# Patient Record
Sex: MALE | Race: BLACK OR AFRICAN AMERICAN | NOT HISPANIC OR LATINO | URBAN - METROPOLITAN AREA
[De-identification: names, ages, dates, MRNs, and addresses within clinical notes are randomized per-mention and may not be internally consistent; named-entity substitution may affect disease eponyms.]

---

## 2017-05-21 ENCOUNTER — EMERGENCY (EMERGENCY)
Facility: HOSPITAL | Age: 42
LOS: 1 days | Discharge: HOME | End: 2017-05-21
Attending: EMERGENCY MEDICINE | Admitting: EMERGENCY MEDICINE
Payer: SELF-PAY

## 2017-05-21 VITALS
SYSTOLIC BLOOD PRESSURE: 154 MMHG | OXYGEN SATURATION: 97 % | TEMPERATURE: 98 F | HEART RATE: 68 BPM | DIASTOLIC BLOOD PRESSURE: 97 MMHG | RESPIRATION RATE: 18 BRPM

## 2017-05-21 DIAGNOSIS — M25.571 PAIN IN RIGHT ANKLE AND JOINTS OF RIGHT FOOT: ICD-10-CM

## 2017-05-21 DIAGNOSIS — L60.0 INGROWING NAIL: ICD-10-CM

## 2017-05-21 PROCEDURE — 99284 EMERGENCY DEPT VISIT MOD MDM: CPT

## 2017-05-21 RX ORDER — DIAZEPAM 5 MG
1 TABLET ORAL
Qty: 12 | Refills: 0 | OUTPATIENT
Start: 2017-05-21 | End: 2017-05-25

## 2017-05-21 RX ORDER — IBUPROFEN 200 MG
1 TABLET ORAL
Qty: 21 | Refills: 0 | OUTPATIENT
Start: 2017-05-21 | End: 2017-05-28

## 2017-05-21 NOTE — ED PROVIDER NOTE - PHYSICAL EXAMINATION
NAD, VSS, Afebrile, No spinal tender, + right great toe nail clean and dry, a small growth to media aspect of toenail, mild tender, no discharge, eryth or cellulitis, N/V- intact.

## 2017-05-21 NOTE — ED ADULT NURSE NOTE - OBJECTIVE STATEMENT
41 year old male A&OX3 presents with right first toe pain. Patient states that he had an ingrown toe nail that was causing pain. Patient was seen by urgent care and told to follow up with PMD. Patient states pain began to get worse and patient presented to podiatry. Patient then was given antibiotics and told to follow up after completion. Patient states that pain has been getting worse and that there appears to be a growth on the toe.

## 2017-05-21 NOTE — ED PROVIDER NOTE - MEDICAL DECISION MAKING DETAILS
Nemes - 42yo M w R halux pain/swelling/pus discharge. Seen [facility] and Podiatry, finished 2 wks of Keflex. Still w pain and pus DC. Will discuss w Podiatry for ER consult vs urgent f/u in the Clinic/office

## 2017-05-21 NOTE — ED PROVIDER NOTE - OBJECTIVE STATEMENT
42yo male pt, ambulatory c/o right great toe pain/ wound.  NITO Figueroa in  trimmed ingrown toe nail 5 weeks ago and he was evaluated by podiatrist 2 weeks ago (on Keflex 500mg BID for 2 weeks). Denies fever or chills. Denies sensory changes or weakness to extremities.

## 2017-05-25 PROBLEM — Z00.00 ENCOUNTER FOR PREVENTIVE HEALTH EXAMINATION: Status: ACTIVE | Noted: 2017-05-25

## 2017-06-16 ENCOUNTER — APPOINTMENT (OUTPATIENT)
Dept: PODIATRY | Facility: HOSPITAL | Age: 42
End: 2017-06-16

## 2017-07-11 NOTE — ED ADULT NURSE NOTE - PAIN: PRESENCE, MLM
FINAL PATHOLOGIC DIAGNOSIS  Stomach, antrum, biopsy:  Moderate chronic active gastritis.  Positive for Helicobacter on routine stain.    H. pylori infection  -     pantoprazole (PROTONIX) 40 MG tablet; Take 1 tablet (40 mg total) by mouth once daily.  Dispense: 30 tablet; Refill: 11  -     amoxicillin (AMOXIL) 500 MG Tab; Take 1 tablet (500 mg total) by mouth every 12 (twelve) hours.  Dispense: 20 tablet; Refill: 0  -     clarithromycin (BIAXIN) 500 MG tablet; Take 1 tablet (500 mg total) by mouth every 12 (twelve) hours.  Dispense: 20 tablet; Refill: 0        Please let the patient know.      Dominguez Nolen    
complains of pain/discomfort

## 2018-03-26 ENCOUNTER — APPOINTMENT (OUTPATIENT)
Dept: CARDIOLOGY | Facility: CLINIC | Age: 43
End: 2018-03-26

## 2019-03-09 ENCOUNTER — TRANSCRIPTION ENCOUNTER (OUTPATIENT)
Age: 44
End: 2019-03-09

## 2019-04-06 ENCOUNTER — TRANSCRIPTION ENCOUNTER (OUTPATIENT)
Age: 44
End: 2019-04-06

## 2019-04-15 ENCOUNTER — APPOINTMENT (OUTPATIENT)
Dept: MRI IMAGING | Facility: CLINIC | Age: 44
End: 2019-04-15

## 2019-08-09 ENCOUNTER — EMERGENCY (EMERGENCY)
Facility: HOSPITAL | Age: 44
LOS: 1 days | Discharge: HOME | End: 2019-08-09
Attending: EMERGENCY MEDICINE | Admitting: EMERGENCY MEDICINE
Payer: COMMERCIAL

## 2019-08-09 VITALS
HEART RATE: 82 BPM | SYSTOLIC BLOOD PRESSURE: 145 MMHG | TEMPERATURE: 98 F | OXYGEN SATURATION: 100 % | DIASTOLIC BLOOD PRESSURE: 93 MMHG | RESPIRATION RATE: 17 BRPM

## 2019-08-09 PROCEDURE — 99282 EMERGENCY DEPT VISIT SF MDM: CPT

## 2019-08-09 NOTE — ED PROVIDER NOTE - NS ED ROS FT
Gen: Denies fever, weight loss  CV: Denies chest pain, palpitations  Skin: Denies rash, erythema, color changes  Resp: Denies SOB, cough  Endo: Denies sensitivity to heat, cold, increased urination  GI: + sore throat, denies constipation, nausea, vomiting  Msk: Denies back pain, LE swelling, extremity pain  : Denies dysuria, increased frequency  Neuro: Denies LOC, weakness, seizures  Psych: Denies hx of psych, hallucinations

## 2019-08-09 NOTE — ED ADULT TRIAGE NOTE - CHIEF COMPLAINT QUOTE
Patient c/o foreign body sensation in throat x1.5 weeks s/p eating at an all you can eat buffet. Patient denies any difficulty breathing or swallowing. Patient speaking in full sentences. Hx. epilepsy

## 2019-08-09 NOTE — ED PROVIDER NOTE - PHYSICAL EXAMINATION
Gen: WDWN, NAD  HEENT: EOMI, no nasal discharge, mucous membranes moist, oral hygene adequate, enlarged non-erythematous adenoids b/l, no foreign body visualized  CV: RRR, +S1/S2, no M/R/G  Resp: CTAB, no W/R/R  GI: Abdomen soft non-distended, NTTP, no masses  MSK: No open wounds, no bruising, no LE edema  Neuro: A&Ox4, following commands, moving all four extremities spontaneously  Psych: appropriate mood, denies AH, VH, SI

## 2019-08-09 NOTE — ED PROVIDER NOTE - OBJECTIVE STATEMENT
44M pmh absence seizures, enlarged adenoids,  here for throat pain. Reports two week hx of feeling of foreign body in throat since eating at buffet. Has had several episodes of rancid taste in throat after swallowing saliva and reports feeling of "something moving in throat" some mild L-sided throat pain.    Reports had x-ray taken at [facility] (but no report) and was told to come to ED for further workup. 44M pmh absence seizures, enlarged adenoids,  here for throat pain. Reports two week hx of feeling of foreign body in throat since eating at buffet. Has had several episodes of rancid taste in throat after swallowing saliva and reports feeling of "something moving in throat" some mild L-sided throat pain.    Reports had x-ray taken at [facility], unremarkable, (but no report in hand) and was told to come to ED for further workup.

## 2019-08-09 NOTE — ED PROVIDER NOTE - ATTENDING CONTRIBUTION TO CARE
Karyna: I have seen and examined the patient face to face, have reviewed and addended the HPI, PE and a/p as necessary.     43 yo M with absence seizures, enlarged adenoids a/w increasing throat pain since eating fried food at a buffet.  Pt reports having increasing pain in the throat, with L sided throat pain.  Went to urgent care with neg xray.  Pt reports having no difficulty swallowing, no stridor, able to tolerate solids and liquids with no acute issues.  No fever/chills, No photophobia/eye pain/changes in vision, No ear pain/dysphagia, No chest pain/palpitations, no SOB/cough/wheeze/stridor, No abdominal pain, No N/V/D, no dysuria/frequency/discharge, No neck/back pain, no rash, no changes in neurological status/function.     GEN - NAD; well appearing; A+O x3; non-toxic appearing   HEENT: noted to have slight abrasion on L tonsilar abscess.  no foreign body noted.    CARD -s1s2, RRR, no M,G,R;   PULM - CTA b/l, symmetric breath sounds;   ABD -  +BS, ND, NT, soft, no guarding, no rebound, no masses;   BACK - no CVA tenderness, Normal  spine;   EXT - symmetric pulses, 2+ dp, capillary refill < 2 seconds, no clubbing, no cyanosis, no edema  NEURO - no focal neuro deficits, no slurred speech    43 yo M a/w throat pain, possible tonsillar irritation, follow up with GI and ENT as described, no acute findings on exam, with no tenderness or pain at this time.

## 2019-08-09 NOTE — ED PROVIDER NOTE - NSFOLLOWUPINSTRUCTIONS_ED_ALL_ED_FT
You were seen today in our emergency department. We were unable to visualize a foreign body in throat but did note your swollen adenoids. We are referring you to gastro-intestinal medicine and to the Ear Nose and Throat physicians as discussed.    When should you seek immediate care?     You have a fever.  You have severe abdominal pain, nausea, or vomiting.   Your vomit or saliva is bloody.  Your bowel movements are black or bloody.     When should I contact my healthcare provider?     You do not find the object in your bowel movement within 2 or 3 days.  You do not want to eat because of abdominal pain or vomiting.  You are drooling or hoarse.  You have questions or concerns about your condition or care.

## 2019-08-09 NOTE — ED PROVIDER NOTE - CLINICAL SUMMARY MEDICAL DECISION MAKING FREE TEXT BOX
44M here w/ feeling of foreign body obstruction s/p buffet meal x 2 weeks no acute distress. oral exam unremakrable except swollen adenoids, no body visualized, nonstriderous.     Pt should f/u w/ ENT outpt will discuss w/ pt.

## 2019-08-12 ENCOUNTER — APPOINTMENT (OUTPATIENT)
Dept: OTOLARYNGOLOGY | Facility: CLINIC | Age: 44
End: 2019-08-12
Payer: COMMERCIAL

## 2019-08-12 VITALS
HEART RATE: 79 BPM | HEIGHT: 73 IN | BODY MASS INDEX: 31.14 KG/M2 | SYSTOLIC BLOOD PRESSURE: 131 MMHG | WEIGHT: 235 LBS | DIASTOLIC BLOOD PRESSURE: 84 MMHG

## 2019-08-12 DIAGNOSIS — J35.9 CHRONIC DISEASE OF TONSILS AND ADENOIDS, UNSPECIFIED: ICD-10-CM

## 2019-08-12 DIAGNOSIS — G52.1 DISORDERS OF GLOSSOPHARYNGEAL NERVE: ICD-10-CM

## 2019-08-12 DIAGNOSIS — J31.2 CHRONIC PHARYNGITIS: ICD-10-CM

## 2019-08-12 PROCEDURE — 31575 DIAGNOSTIC LARYNGOSCOPY: CPT

## 2019-08-12 PROCEDURE — 99203 OFFICE O/P NEW LOW 30 MIN: CPT | Mod: 25

## 2019-08-12 NOTE — REVIEW OF SYSTEMS
[Sneezing] : sneezing [Seasonal Allergies] : seasonal allergies [Problem Snoring] : problem snoring [Swollen Glands] : swollen glands [Negative] : Endocrine [Patient Intake Form Reviewed] : Patient intake form was reviewed

## 2019-08-12 NOTE — HISTORY OF PRESENT ILLNESS
[de-identified] : 2 week hx sensation left side throat\par co swollen area dx laceration\par long hx swollen neck glands, neg hx tonsillitis

## 2019-09-17 ENCOUNTER — OUTPATIENT (OUTPATIENT)
Dept: OUTPATIENT SERVICES | Facility: HOSPITAL | Age: 44
LOS: 1 days | End: 2019-09-17
Payer: COMMERCIAL

## 2019-09-17 ENCOUNTER — APPOINTMENT (OUTPATIENT)
Dept: CT IMAGING | Facility: CLINIC | Age: 44
End: 2019-09-17
Payer: COMMERCIAL

## 2019-09-17 DIAGNOSIS — Z00.8 ENCOUNTER FOR OTHER GENERAL EXAMINATION: ICD-10-CM

## 2019-09-17 PROCEDURE — 70491 CT SOFT TISSUE NECK W/DYE: CPT | Mod: 26

## 2019-09-17 PROCEDURE — 70491 CT SOFT TISSUE NECK W/DYE: CPT

## 2019-09-19 ENCOUNTER — APPOINTMENT (OUTPATIENT)
Dept: RADIOLOGY | Facility: HOSPITAL | Age: 44
End: 2019-09-19
Payer: COMMERCIAL

## 2019-09-19 ENCOUNTER — OUTPATIENT (OUTPATIENT)
Dept: OUTPATIENT SERVICES | Facility: HOSPITAL | Age: 44
LOS: 1 days | End: 2019-09-19

## 2019-09-19 DIAGNOSIS — R13.19 OTHER DYSPHAGIA: ICD-10-CM

## 2019-09-19 DIAGNOSIS — R13.13 DYSPHAGIA, PHARYNGEAL PHASE: ICD-10-CM

## 2019-09-19 PROCEDURE — 74220 X-RAY XM ESOPHAGUS 1CNTRST: CPT | Mod: 26

## 2019-10-01 ENCOUNTER — APPOINTMENT (OUTPATIENT)
Dept: ULTRASOUND IMAGING | Facility: IMAGING CENTER | Age: 44
End: 2019-10-01
Payer: COMMERCIAL

## 2019-10-01 ENCOUNTER — OUTPATIENT (OUTPATIENT)
Dept: OUTPATIENT SERVICES | Facility: HOSPITAL | Age: 44
LOS: 1 days | End: 2019-10-01
Payer: COMMERCIAL

## 2019-10-01 DIAGNOSIS — E04.1 NONTOXIC SINGLE THYROID NODULE: ICD-10-CM

## 2019-10-01 DIAGNOSIS — R59.0 LOCALIZED ENLARGED LYMPH NODES: ICD-10-CM

## 2019-10-01 PROCEDURE — 76536 US EXAM OF HEAD AND NECK: CPT

## 2019-10-01 PROCEDURE — 76536 US EXAM OF HEAD AND NECK: CPT | Mod: 26

## 2019-10-03 ENCOUNTER — APPOINTMENT (OUTPATIENT)
Dept: FAMILY MEDICINE | Facility: CLINIC | Age: 44
End: 2019-10-03
Payer: COMMERCIAL

## 2019-10-03 VITALS
HEART RATE: 87 BPM | OXYGEN SATURATION: 97 % | RESPIRATION RATE: 16 BRPM | BODY MASS INDEX: 31.14 KG/M2 | DIASTOLIC BLOOD PRESSURE: 80 MMHG | SYSTOLIC BLOOD PRESSURE: 120 MMHG | WEIGHT: 235 LBS | HEIGHT: 73 IN

## 2019-10-03 DIAGNOSIS — G40.A09 ABSENCE EPILEPTIC SYNDROME, NOT INTRACTABLE, W/OUT STATUS EPILEPTICUS: ICD-10-CM

## 2019-10-03 DIAGNOSIS — Z83.3 FAMILY HISTORY OF DIABETES MELLITUS: ICD-10-CM

## 2019-10-03 DIAGNOSIS — R05 COUGH: ICD-10-CM

## 2019-10-03 DIAGNOSIS — Z82.0 FAMILY HISTORY OF EPILEPSY AND OTHER DISEASES OF THE NERVOUS SYSTEM: ICD-10-CM

## 2019-10-03 DIAGNOSIS — Z82.49 FAMILY HISTORY OF ISCHEMIC HEART DISEASE AND OTHER DISEASES OF THE CIRCULATORY SYSTEM: ICD-10-CM

## 2019-10-03 DIAGNOSIS — R42 DIZZINESS AND GIDDINESS: ICD-10-CM

## 2019-10-03 DIAGNOSIS — Z91.018 ALLERGY TO OTHER FOODS: ICD-10-CM

## 2019-10-03 DIAGNOSIS — J39.2 OTHER DISEASES OF PHARYNX: ICD-10-CM

## 2019-10-03 DIAGNOSIS — Z80.3 FAMILY HISTORY OF MALIGNANT NEOPLASM OF BREAST: ICD-10-CM

## 2019-10-03 PROCEDURE — 99204 OFFICE O/P NEW MOD 45 MIN: CPT

## 2019-10-03 PROCEDURE — G0444 DEPRESSION SCREEN ANNUAL: CPT | Mod: 59

## 2019-10-03 NOTE — PLAN
[FreeTextEntry1] : Patient had not had bloodwork.  Advised to go for fasting bloodwork, evaluate bloodcount. Thyroid levels. \par Benign lymph node on US, some heterogenous on report.  Advised to continue and complete Augmentin.  \par \par Neuro exam intact in office, advised Neurology evaluation.\par Also evaluation for allergies-possible irritant contributing to symptoms. \par \par Ask to have recent records from ENT sent to our office.

## 2019-10-03 NOTE — REVIEW OF SYSTEMS
[Sore Throat] : sore throat [Negative] : Genitourinary [Wheezing] : no wheezing [Cough] : no cough [FreeTextEntry4] : congestion, sneezing [de-identified] : episodes of imbalance

## 2019-10-03 NOTE — HISTORY OF PRESENT ILLNESS
[FreeTextEntry8] : Shrimp irritation, started having throat irritation in August.  Was evaluated in ER.  Had CT.\par Has seen 2 ENT.  Feels like golf ball in throat-has been that way since seeing ENT, started on Augmentin last week.   \par \par Reports throat irritation after eating shrimp again last week. \par Last seizure in 2010. \par Also reports episodes of imbalance and lightheadedness.  Has not seen Neurology in over 1 year.\par \par Zyrtec when needed for allergies. \par

## 2019-10-03 NOTE — PHYSICAL EXAM
[Normal Sclera/Conjunctiva] : normal sclera/conjunctiva [PERRL] : pupils equal round and reactive to light [Normal TMs] : both tympanic membranes were normal [No Extremity Clubbing/Cyanosis] : no extremity clubbing/cyanosis [Normal] : soft, non-tender, non-distended, no masses palpated, no HSM and normal bowel sounds [Alert and Oriented x3] : oriented to person, place, and time [de-identified] : erythema, no exudate [de-identified] : CN II-XII intact

## 2019-10-21 ENCOUNTER — LABORATORY RESULT (OUTPATIENT)
Age: 44
End: 2019-10-21

## 2019-10-22 ENCOUNTER — OTHER (OUTPATIENT)
Age: 44
End: 2019-10-22

## 2019-10-22 LAB
ALBUMIN SERPL ELPH-MCNC: 4.1 G/DL
ALP BLD-CCNC: 121 U/L
ALT SERPL-CCNC: 22 U/L
ANION GAP SERPL CALC-SCNC: 13 MMOL/L
AST SERPL-CCNC: 16 U/L
BASOPHILS # BLD AUTO: 0.02 K/UL
BASOPHILS NFR BLD AUTO: 0.4 %
BILIRUB SERPL-MCNC: 0.8 MG/DL
BUN SERPL-MCNC: 12 MG/DL
CALCIUM SERPL-MCNC: 9.7 MG/DL
CHLORIDE SERPL-SCNC: 103 MMOL/L
CHOLEST SERPL-MCNC: 111 MG/DL
CHOLEST/HDLC SERPL: 4 RATIO
CO2 SERPL-SCNC: 24 MMOL/L
CREAT SERPL-MCNC: 0.94 MG/DL
EOSINOPHIL # BLD AUTO: 0.09 K/UL
EOSINOPHIL NFR BLD AUTO: 1.6 %
ESTIMATED AVERAGE GLUCOSE: 148 MG/DL
GLUCOSE SERPL-MCNC: 162 MG/DL
HBA1C MFR BLD HPLC: 6.8 %
HCT VFR BLD CALC: 45.1 %
HDLC SERPL-MCNC: 28 MG/DL
HGB BLD-MCNC: 14.1 G/DL
IMM GRANULOCYTES NFR BLD AUTO: 0.2 %
LDLC SERPL CALC-MCNC: 55 MG/DL
LYMPHOCYTES # BLD AUTO: 2.56 K/UL
LYMPHOCYTES NFR BLD AUTO: 46.6 %
MAN DIFF?: NORMAL
MCHC RBC-ENTMCNC: 23.3 PG
MCHC RBC-ENTMCNC: 31.3 GM/DL
MCV RBC AUTO: 74.4 FL
MONOCYTES # BLD AUTO: 0.53 K/UL
MONOCYTES NFR BLD AUTO: 9.7 %
NEUTROPHILS # BLD AUTO: 2.28 K/UL
NEUTROPHILS NFR BLD AUTO: 41.5 %
PLATELET # BLD AUTO: 345 K/UL
POTASSIUM SERPL-SCNC: 4.2 MMOL/L
PROT SERPL-MCNC: 7 G/DL
RBC # BLD: 6.06 M/UL
RBC # FLD: 14.3 %
SODIUM SERPL-SCNC: 140 MMOL/L
THYROGLOB AB SERPL-ACNC: <20 IU/ML
THYROPEROXIDASE AB SERPL IA-ACNC: 1874 IU/ML
TRIGL SERPL-MCNC: 141 MG/DL
TSH SERPL-ACNC: <0.01 UIU/ML
WBC # FLD AUTO: 5.49 K/UL

## 2019-10-23 ENCOUNTER — OTHER (OUTPATIENT)
Age: 44
End: 2019-10-23

## 2019-10-28 LAB
CRP SERPL-MCNC: 0.13 MG/DL
TSH RECEPTOR AB: 4.94 IU/L

## 2019-10-30 LAB — TSI ACT/NOR SER: 11.7 IU/L

## 2019-10-31 ENCOUNTER — APPOINTMENT (OUTPATIENT)
Dept: ENDOCRINOLOGY | Facility: CLINIC | Age: 44
End: 2019-10-31
Payer: COMMERCIAL

## 2019-10-31 VITALS
OXYGEN SATURATION: 98 % | BODY MASS INDEX: 28.89 KG/M2 | SYSTOLIC BLOOD PRESSURE: 120 MMHG | HEART RATE: 93 BPM | DIASTOLIC BLOOD PRESSURE: 70 MMHG | RESPIRATION RATE: 16 BRPM | HEIGHT: 73 IN | WEIGHT: 218 LBS

## 2019-10-31 LAB — GLUCOSE BLDC GLUCOMTR-MCNC: 107

## 2019-10-31 PROCEDURE — 82962 GLUCOSE BLOOD TEST: CPT

## 2019-10-31 PROCEDURE — 99204 OFFICE O/P NEW MOD 45 MIN: CPT

## 2019-10-31 NOTE — ASSESSMENT
[FreeTextEntry1] : Graves disease\par Available therapeutic approaches to hyperthyroidism, including antithyroidal medications, I-131 therapy, and total thyroidectomy were reviewed. \par Risk and benefits of each approach were extensively discussed (including, but not limited to permanent hypothyroidism post-thyroidectomy or I-131 therapy, and increased risk of life-threatening neutropenia or liver failure on thionamides).\par - mutually agreed to start MMI 20mg bid. Monitor for rash, fever etc\par - increase inderal 20mg tid \par - Current approaches to diabetes management are discussed with the patient. \par Target ranges for blood sugar, blood pressure and cholesterol reviewed, and risk reduction strategies verified. \par Hypoglycemia precautions reviewed with the patient. \par Suggested extensive diabetes education program, including nutritional and diabetes teaching and evaluation. \par Proper dietary restrictions and exercise routines discussed. \par Glucometer/SMBG and log book charting discussed.\par RTC 1 month\par  [Methimazole Therapy] : Risks and benefits of methimazole therapy were discussed with the patient,  including rash, liver dysfunction, and agranulocytosis.  Patient was instructed to call the office for flu-like symptoms eg fever and sore-throat

## 2019-10-31 NOTE — CONSULT LETTER
[Dear  ___] : Dear  [unfilled], [Sincerely,] : Sincerely, [FreeTextEntry1] : Thank you for referring  Mr. ABDIRIZAK AYALA to me for evaluation and treatment. Please, see attached consultation note. As always, if there are specific questions you would like to discuss, please feel free to contact me.\par Thank you for the courtesy of this evaluation.\par  [FreeTextEntry3] : Tessy Morales MD, FACE, ECNU\par

## 2019-10-31 NOTE — HISTORY OF PRESENT ILLNESS
[FreeTextEntry1] : 44 year male referred for thyroid evaluation.\par He developed "throat irritation" in August, was seen by ENT and treated with Augmentin for a presumed infection. \par Patient denies prior history of thyroid disorders.\par Denies recent URI, exposure to iodine-containing medications, supplements/ IV contrast, PO or IV steroids. \par Denies family h/o thyroid cancer or history of radiation exposure to head and neck area in a childhood.\par Patient complains of 40 lbs weight loss within past 3 months, fatigue, palpitations\par he denies diarrhea, skin/nail changes, hair loss, periorbital edema, anxiety, problems sleeping.\par \par TSH < 0.01,  FT4- 4.5, \par a1c- 6.8\par + TPO ab (1874), neg Tg ab\par + TBII - 4.94\par + TSI- 11.70\par \par Thyroid US (10/1/19)- enlarged heterogeneous thyroid. No nodules\par CT neck (9/17/19)- prominent nasopharyngeal soft tissue, likely residual adenoids

## 2019-11-06 ENCOUNTER — APPOINTMENT (OUTPATIENT)
Dept: ENDOCRINOLOGY | Facility: CLINIC | Age: 44
End: 2019-11-06

## 2019-12-09 ENCOUNTER — APPOINTMENT (OUTPATIENT)
Dept: ENDOCRINOLOGY | Facility: CLINIC | Age: 44
End: 2019-12-09
Payer: COMMERCIAL

## 2019-12-09 VITALS
BODY MASS INDEX: 29.95 KG/M2 | OXYGEN SATURATION: 98 % | DIASTOLIC BLOOD PRESSURE: 60 MMHG | SYSTOLIC BLOOD PRESSURE: 120 MMHG | WEIGHT: 226 LBS | HEART RATE: 78 BPM | RESPIRATION RATE: 16 BRPM | HEIGHT: 73 IN

## 2019-12-09 LAB — GLUCOSE BLDC GLUCOMTR-MCNC: 134

## 2019-12-09 PROCEDURE — 82962 GLUCOSE BLOOD TEST: CPT

## 2019-12-09 PROCEDURE — 99213 OFFICE O/P EST LOW 20 MIN: CPT | Mod: 25

## 2019-12-09 PROCEDURE — 36415 COLL VENOUS BLD VENIPUNCTURE: CPT

## 2019-12-09 NOTE — ASSESSMENT
[Methimazole Therapy] : Risks and benefits of methimazole therapy were discussed with the patient,  including rash, liver dysfunction, and agranulocytosis.  Patient was instructed to call the office for flu-like symptoms eg fever and sore-throat [FreeTextEntry1] : Graves disease\par - labs today\par - cont MMI 20mg bid for now, pending labs\par - inderal 20mg tid \par RTC 2 months\par

## 2019-12-09 NOTE — HISTORY OF PRESENT ILLNESS
[FreeTextEntry1] : 44 year male f/u for hyperthyroidism/Graves\par \par *** Dec 09, 2019 ***\par \par on mmi 20mg bid, inderal 20mg tid\par feels much better overall\par no rpt labs yet\par \par \par HPI:\par He developed "throat irritation" in August, was seen by ENT and treated with Augmentin for a presumed infection. \par Patient denies prior history of thyroid disorders.\par Denies recent URI, exposure to iodine-containing medications, supplements/ IV contrast, PO or IV steroids. \par Denies family h/o thyroid cancer or history of radiation exposure to head and neck area in a childhood.\par Patient complains of 40 lbs weight loss within past 3 months, fatigue, palpitations\par he denies diarrhea, skin/nail changes, hair loss, periorbital edema, anxiety, problems sleeping.\par \par TSH < 0.01,  FT4- 4.5, \par a1c- 6.8\par + TPO ab (1874), neg Tg ab\par + TBII - 4.94\par + TSI- 11.70\par \par Thyroid US (10/1/19)- enlarged heterogeneous thyroid. No nodules\par CT neck (9/17/19)- prominent nasopharyngeal soft tissue, likely residual adenoids

## 2019-12-10 LAB
ALBUMIN SERPL ELPH-MCNC: 4.2 G/DL
ALP BLD-CCNC: 154 U/L
ALT SERPL-CCNC: 40 U/L
ANION GAP SERPL CALC-SCNC: 13 MMOL/L
AST SERPL-CCNC: 18 U/L
BASOPHILS # BLD AUTO: 0.04 K/UL
BASOPHILS NFR BLD AUTO: 0.6 %
BILIRUB SERPL-MCNC: 0.6 MG/DL
BUN SERPL-MCNC: 12 MG/DL
CALCIUM SERPL-MCNC: 9.5 MG/DL
CHLORIDE SERPL-SCNC: 103 MMOL/L
CO2 SERPL-SCNC: 27 MMOL/L
CREAT SERPL-MCNC: 1.09 MG/DL
EOSINOPHIL # BLD AUTO: 0.14 K/UL
EOSINOPHIL NFR BLD AUTO: 2.1 %
GLUCOSE SERPL-MCNC: 105 MG/DL
HCT VFR BLD CALC: 45.4 %
HGB BLD-MCNC: 14 G/DL
IMM GRANULOCYTES NFR BLD AUTO: 0.5 %
LYMPHOCYTES # BLD AUTO: 3.03 K/UL
LYMPHOCYTES NFR BLD AUTO: 46 %
MAN DIFF?: NORMAL
MCHC RBC-ENTMCNC: 23.5 PG
MCHC RBC-ENTMCNC: 30.8 GM/DL
MCV RBC AUTO: 76 FL
MONOCYTES # BLD AUTO: 0.48 K/UL
MONOCYTES NFR BLD AUTO: 7.3 %
NEUTROPHILS # BLD AUTO: 2.86 K/UL
NEUTROPHILS NFR BLD AUTO: 43.5 %
PLATELET # BLD AUTO: 336 K/UL
POTASSIUM SERPL-SCNC: 4.8 MMOL/L
PROT SERPL-MCNC: 7 G/DL
RBC # BLD: 5.97 M/UL
RBC # FLD: 17.4 %
SODIUM SERPL-SCNC: 143 MMOL/L
T3 SERPL-MCNC: 190 NG/DL
T4 FREE SERPL-MCNC: 1.3 NG/DL
TSH SERPL-ACNC: <0.01 UIU/ML
WBC # FLD AUTO: 6.58 K/UL

## 2019-12-22 ENCOUNTER — TRANSCRIPTION ENCOUNTER (OUTPATIENT)
Age: 44
End: 2019-12-22

## 2020-01-14 ENCOUNTER — NON-APPOINTMENT (OUTPATIENT)
Age: 45
End: 2020-01-14

## 2020-01-14 ENCOUNTER — APPOINTMENT (OUTPATIENT)
Dept: FAMILY MEDICINE | Facility: CLINIC | Age: 45
End: 2020-01-14
Payer: COMMERCIAL

## 2020-01-14 VITALS
TEMPERATURE: 97.5 F | HEART RATE: 56 BPM | DIASTOLIC BLOOD PRESSURE: 80 MMHG | SYSTOLIC BLOOD PRESSURE: 116 MMHG | HEIGHT: 73 IN | OXYGEN SATURATION: 99 %

## 2020-01-14 DIAGNOSIS — M62.838 OTHER MUSCLE SPASM: ICD-10-CM

## 2020-01-14 DIAGNOSIS — R94.31 ABNORMAL ELECTROCARDIOGRAM [ECG] [EKG]: ICD-10-CM

## 2020-01-14 PROCEDURE — 93000 ELECTROCARDIOGRAM COMPLETE: CPT

## 2020-01-14 PROCEDURE — 99214 OFFICE O/P EST MOD 30 MIN: CPT | Mod: 25

## 2020-01-14 RX ORDER — AMOXICILLIN AND CLAVULANATE POTASSIUM 875; 125 MG/1; MG/1
875-125 TABLET, COATED ORAL
Refills: 0 | Status: DISCONTINUED | COMMUNITY
Start: 2019-10-03 | End: 2020-01-14

## 2020-01-14 NOTE — PHYSICAL EXAM
[Clear to Auscultation] : lungs were clear to auscultation bilaterally [Well Nourished] : well nourished [Normal S1, S2] : normal S1 and S2 [Regular Rhythm] : with a regular rhythm [de-identified] : left upper trap spasm

## 2020-01-14 NOTE — ASSESSMENT
[FreeTextEntry1] : abnromal ekg with occasional spasm - referral to cardio\par \par hyperthyroid\par was recently decreased on meds, feeling okay\par \par diabetes\par refuses treatment, risks identified and evaluated\par \par muscle spasm\par Patient was advised to rest and to use moist heat throughout the day and stretch as appropriate. Patient was advised to take all medications as prescribed. Patient was advised if symptoms persist or worsen return to office.\par

## 2020-01-14 NOTE — HISTORY OF PRESENT ILLNESS
[de-identified] : 44 year old male here with complaints of left arm pain, back pain, occasional chest discomfort . Patients active medications, allergies and issues were all reviewed with the patient at time of visit.\par

## 2020-02-11 ENCOUNTER — APPOINTMENT (OUTPATIENT)
Dept: ENDOCRINOLOGY | Facility: CLINIC | Age: 45
End: 2020-02-11
Payer: COMMERCIAL

## 2020-02-11 VITALS
RESPIRATION RATE: 16 BRPM | BODY MASS INDEX: 31.81 KG/M2 | HEIGHT: 73 IN | DIASTOLIC BLOOD PRESSURE: 70 MMHG | OXYGEN SATURATION: 96 % | WEIGHT: 240 LBS | HEART RATE: 66 BPM | SYSTOLIC BLOOD PRESSURE: 120 MMHG

## 2020-02-11 LAB — GLUCOSE BLDC GLUCOMTR-MCNC: 129

## 2020-02-11 PROCEDURE — 36415 COLL VENOUS BLD VENIPUNCTURE: CPT

## 2020-02-11 PROCEDURE — 82962 GLUCOSE BLOOD TEST: CPT

## 2020-02-11 PROCEDURE — 99214 OFFICE O/P EST MOD 30 MIN: CPT | Mod: 25

## 2020-02-11 NOTE — HISTORY OF PRESENT ILLNESS
[FreeTextEntry1] : 44 year male f/u for hyperthyroidism/Graves\par \par *** Feb 11, 2020 ***\par \par feels well. gained weight\par still on mmi 20 mg bid ("forgot to lower the dose"), inderal 20mg tid\par \par *** Dec 09, 2019 ***\par \par on mmi 20mg bid, inderal 20mg tid\par feels much better overall\par no rpt labs yet\par \par \par HPI:\par He developed "throat irritation" in August, was seen by ENT and treated with Augmentin for a presumed infection. \par Patient denies prior history of thyroid disorders.\par Denies recent URI, exposure to iodine-containing medications, supplements/ IV contrast, PO or IV steroids. \par Denies family h/o thyroid cancer or history of radiation exposure to head and neck area in a childhood.\par Patient complains of 40 lbs weight loss within past 3 months, fatigue, palpitations\par he denies diarrhea, skin/nail changes, hair loss, periorbital edema, anxiety, problems sleeping.\par \par TSH < 0.01,  FT4- 4.5, \par a1c- 6.8\par + TPO ab (1874), neg Tg ab\par + TBII - 4.94\par + TSI- 11.70\par \par Thyroid US (10/1/19)- enlarged heterogeneous thyroid. No nodules\par CT neck (9/17/19)- prominent nasopharyngeal soft tissue, likely residual adenoids

## 2020-02-11 NOTE — ASSESSMENT
[Methimazole Therapy] : Risks and benefits of methimazole therapy were discussed with the patient,  including rash, liver dysfunction, and agranulocytosis.  Patient was instructed to call the office for flu-like symptoms eg fever and sore-throat [FreeTextEntry1] : Graves disease\par advised on meds compliance\par - labs today and likely decr MMI 10mg bid \par - inderal 20mg tid \par RTC 2 months\par

## 2020-02-12 ENCOUNTER — APPOINTMENT (OUTPATIENT)
Dept: CARDIOLOGY | Facility: CLINIC | Age: 45
End: 2020-02-12

## 2020-02-12 LAB
ALBUMIN SERPL ELPH-MCNC: 4.6 G/DL
ALP BLD-CCNC: 129 U/L
ALT SERPL-CCNC: 12 U/L
AST SERPL-CCNC: 13 U/L
BASOPHILS # BLD AUTO: 0.07 K/UL
BASOPHILS NFR BLD AUTO: 1.1 %
BILIRUB DIRECT SERPL-MCNC: 0.2 MG/DL
BILIRUB INDIRECT SERPL-MCNC: 0.3 MG/DL
BILIRUB SERPL-MCNC: 0.5 MG/DL
EOSINOPHIL # BLD AUTO: 0.11 K/UL
EOSINOPHIL NFR BLD AUTO: 1.8 %
HCT VFR BLD CALC: 49.1 %
HGB BLD-MCNC: 15.2 G/DL
IMM GRANULOCYTES NFR BLD AUTO: 0.5 %
LYMPHOCYTES # BLD AUTO: 2.95 K/UL
LYMPHOCYTES NFR BLD AUTO: 48.1 %
MAN DIFF?: NORMAL
MCHC RBC-ENTMCNC: 24.4 PG
MCHC RBC-ENTMCNC: 31 GM/DL
MCV RBC AUTO: 78.7 FL
MONOCYTES # BLD AUTO: 0.41 K/UL
MONOCYTES NFR BLD AUTO: 6.7 %
NEUTROPHILS # BLD AUTO: 2.56 K/UL
NEUTROPHILS NFR BLD AUTO: 41.8 %
PLATELET # BLD AUTO: 309 K/UL
PROT SERPL-MCNC: 7.2 G/DL
RBC # BLD: 6.24 M/UL
RBC # FLD: 18.8 %
T3 SERPL-MCNC: 102 NG/DL
T4 FREE SERPL-MCNC: 0.4 NG/DL
TSH SERPL-ACNC: 11 UIU/ML
WBC # FLD AUTO: 6.13 K/UL

## 2020-02-13 LAB — TSI ACT/NOR SER: 12.6 IU/L

## 2020-02-14 LAB — TSH RECEPTOR AB: 1.6 IU/L

## 2020-02-25 ENCOUNTER — APPOINTMENT (OUTPATIENT)
Dept: FAMILY MEDICINE | Facility: CLINIC | Age: 45
End: 2020-02-25

## 2020-04-07 ENCOUNTER — APPOINTMENT (OUTPATIENT)
Dept: ENDOCRINOLOGY | Facility: CLINIC | Age: 45
End: 2020-04-07
Payer: COMMERCIAL

## 2020-04-07 PROCEDURE — 99213 OFFICE O/P EST LOW 20 MIN: CPT

## 2020-04-07 NOTE — ASSESSMENT
[Methimazole Therapy] : Risks and benefits of methimazole therapy were discussed with the patient,  including rash, liver dysfunction, and agranulocytosis.  Patient was instructed to call the office for flu-like symptoms eg fever and sore-throat [FreeTextEntry1] : Graves disease\par advised on meds compliance\par - cont  MMI 10mg qd\par - inderal 20mg tid \par - labs asap and call for results\par RTC 2 months\par

## 2020-04-07 NOTE — HISTORY OF PRESENT ILLNESS
[Home] : at home, [unfilled] , at the time of the visit. [Medical Office: ([facility])___] : at ~his/her~ medical office located in [place] [Patient] : the patient [Self] : self [FreeTextEntry1] : 44 year male f/u for hyperthyroidism/Graves\par \par *** Televisit  Apr 07, 2020 ***\par \par on MMI 10 gm qd, inderal 20 mg tid\par feels better overall. not gaining weight anymore\par \par *** Feb 11, 2020 ***\par \par feels well. gained weight\par still on mmi 20 mg bid ("forgot to lower the dose"), inderal 20mg tid\par \par *** Dec 09, 2019 ***\par \par on mmi 20mg bid, inderal 20mg tid\par feels much better overall\par no rpt labs yet\par \par \par HPI:\par He developed "throat irritation" in August, was seen by ENT and treated with Augmentin for a presumed infection. \par Patient denies prior history of thyroid disorders.\par Denies recent URI, exposure to iodine-containing medications, supplements/ IV contrast, PO or IV steroids. \par Denies family h/o thyroid cancer or history of radiation exposure to head and neck area in a childhood.\par Patient complains of 40 lbs weight loss within past 3 months, fatigue, palpitations\par he denies diarrhea, skin/nail changes, hair loss, periorbital edema, anxiety, problems sleeping.\par \par TSH < 0.01,  FT4- 4.5, \par a1c- 6.8\par + TPO ab (1874), neg Tg ab\par + TBII - 4.94\par + TSI- 11.70\par \par Thyroid US (10/1/19)- enlarged heterogeneous thyroid. No nodules\par CT neck (9/17/19)- prominent nasopharyngeal soft tissue, likely residual adenoids

## 2020-05-21 LAB
ALBUMIN SERPL ELPH-MCNC: 4.2 G/DL
ALP BLD-CCNC: 99 U/L
ALT SERPL-CCNC: 17 U/L
AST SERPL-CCNC: 16 U/L
BASOPHILS # BLD AUTO: 0.05 K/UL
BASOPHILS NFR BLD AUTO: 0.8 %
BILIRUB DIRECT SERPL-MCNC: 0.2 MG/DL
BILIRUB INDIRECT SERPL-MCNC: 0.5 MG/DL
BILIRUB SERPL-MCNC: 0.7 MG/DL
EOSINOPHIL # BLD AUTO: 0.13 K/UL
EOSINOPHIL NFR BLD AUTO: 2.1 %
HCT VFR BLD CALC: 45.2 %
HGB BLD-MCNC: 14.3 G/DL
IMM GRANULOCYTES NFR BLD AUTO: 0.8 %
LYMPHOCYTES # BLD AUTO: 2.95 K/UL
LYMPHOCYTES NFR BLD AUTO: 48.7 %
MAN DIFF?: NORMAL
MCHC RBC-ENTMCNC: 25.6 PG
MCHC RBC-ENTMCNC: 31.6 GM/DL
MCV RBC AUTO: 80.9 FL
MONOCYTES # BLD AUTO: 0.44 K/UL
MONOCYTES NFR BLD AUTO: 7.3 %
NEUTROPHILS # BLD AUTO: 2.44 K/UL
NEUTROPHILS NFR BLD AUTO: 40.3 %
PLATELET # BLD AUTO: 273 K/UL
PROT SERPL-MCNC: 6.9 G/DL
RBC # BLD: 5.59 M/UL
RBC # FLD: 14.1 %
T3 SERPL-MCNC: 101 NG/DL
T4 FREE SERPL-MCNC: 0.4 NG/DL
TSH SERPL-ACNC: 9.98 UIU/ML
WBC # FLD AUTO: 6.06 K/UL

## 2020-05-28 ENCOUNTER — APPOINTMENT (OUTPATIENT)
Dept: ENDOCRINOLOGY | Facility: CLINIC | Age: 45
End: 2020-05-28
Payer: COMMERCIAL

## 2020-05-28 PROCEDURE — 99214 OFFICE O/P EST MOD 30 MIN: CPT | Mod: 95

## 2020-05-28 NOTE — ASSESSMENT
[Methimazole Therapy] : Risks and benefits of methimazole therapy were discussed with the patient,  including rash, liver dysfunction, and agranulocytosis.  Patient was instructed to call the office for flu-like symptoms eg fever and sore-throat [FreeTextEntry1] : Graves disease. Apparently going into remission\par - keep off MMI for now\par - monitor antibodies status\par - decr inderal 20mg qd-bid PRN\par RTC 3-4 weeks (in -person)

## 2020-05-28 NOTE — HISTORY OF PRESENT ILLNESS
[Home] : at home, [unfilled] , at the time of the visit. [Verbal consent obtained from patient] : the patient, [unfilled] [Medical Office: ([facility])___] : at the medical office located in  [FreeTextEntry1] : 44 year male f/u for hyperthyroidism/Graves\par \par *** Televisit  May 28, 2020 ***\par \par feels much better since stopping MMI\par no more legs pain. gained about 10 lbs\par \par *** Televisit  Apr 07, 2020 ***\par \par on MMI 10 gm qd, inderal 20 mg tid\par feels better overall. not gaining weight anymore\par \par *** Feb 11, 2020 ***\par \par feels well. gained weight\par still on mmi 20 mg bid ("forgot to lower the dose"), inderal 20mg tid\par \par *** Dec 09, 2019 ***\par \par on mmi 20mg bid, inderal 20mg tid\par feels much better overall\par no rpt labs yet\par \par \par HPI:\par He developed "throat irritation" in August, was seen by ENT and treated with Augmentin for a presumed infection. \par Patient denies prior history of thyroid disorders.\par Denies recent URI, exposure to iodine-containing medications, supplements/ IV contrast, PO or IV steroids. \par Denies family h/o thyroid cancer or history of radiation exposure to head and neck area in a childhood.\par Patient complains of 40 lbs weight loss within past 3 months, fatigue, palpitations\par he denies diarrhea, skin/nail changes, hair loss, periorbital edema, anxiety, problems sleeping.\par \par TSH < 0.01,  FT4- 4.5, \par a1c- 6.8\par + TPO ab (1874), neg Tg ab\par + TBII - 4.94\par + TSI- 11.70\par \par Thyroid US (10/1/19)- enlarged heterogeneous thyroid. No nodules\par CT neck (9/17/19)- prominent nasopharyngeal soft tissue, likely residual adenoids

## 2020-07-02 ENCOUNTER — APPOINTMENT (OUTPATIENT)
Dept: ORTHOPEDIC SURGERY | Facility: CLINIC | Age: 45
End: 2020-07-02
Payer: COMMERCIAL

## 2020-07-02 VITALS — TEMPERATURE: 98.3 F

## 2020-07-02 PROCEDURE — 73562 X-RAY EXAM OF KNEE 3: CPT | Mod: RT

## 2020-07-02 PROCEDURE — 99204 OFFICE O/P NEW MOD 45 MIN: CPT

## 2020-07-02 NOTE — REASON FOR VISIT
[Initial Visit] : an initial visit for [Family Member] : family member [FreeTextEntry2] : right knee pain

## 2020-07-02 NOTE — PHYSICAL EXAM
[de-identified] : Constitutional: Well-nourished, well-developed, No acute distress\par Respiratory:  Good respiratory effort, no SOB\par Lymphatic: No regional lymphadenopathy, no lymphedema\par Psychiatric: Pleasant and normal affect, alert and oriented x3\par Skin: Clean dry and intact B/L UE/LE\par Musculoskeletal: normal except where as noted in regional exam\par \par B/L Hips: No asymmetry, malalignment, or swelling, Full ROM, 5/5 strength in flexion/ext, IR/ER, Abd/Add, Joints stable\par B/L Ankles: No asymmetry, malalignment, or swelling, Full ROM, 5/5 strength in DF/PF/Inv/Ev, Joints stable\par \par BIOMECHANICAL EXAM: no marked leg length discrepancy, moderate hip abductor weakness b/l, no marked pes planus or foot pronation, tight hams and ITB b/l.  Normal gait and station\par \par Left Knee:\par APPEARANCE: no marked deformities, no swelling or malalignment\par POSITIVE TENDERNESS:  + crepitus of the anterior knee, and tenderness of patellar retinaculum\par NONTENDER: jt lines b/l, patellar & quadriceps tendons, MCL/LCL, ITB at the lateral femoral condyle & Gerdy's tubercle, pes bursa. \par ROM: full & painless, although some discomfort in deep knee flexion\par RESISTIVE TESTING: + discomfort with knee ext from deep knee flexion (stretched position), painless knee flexion. \par SPECIAL TESTS: stable v/v stress. painless grind. neg Lachman's. neg ant/post drawer. neg Shaye's. neg Thessaly test. neg Lalitha's & Malacrae's\par NEURO: Normal sensation of LE, DTRs 2+/4 patella and achilles\par PULSES: 2+ DP/PT pulses\par \par Right Knee:\par APPEARANCE: no marked deformities, no swelling or malalignment\par POSITIVE TENDERNESS:  + crepitus of the anterior knee, and tenderness of patellar retinaculum\par NONTENDER: jt lines b/l, patellar & quadriceps tendons, MCL/LCL, ITB at the lateral femoral condyle & Gerdy's tubercle, pes bursa. \par ROM: full & painless, although some discomfort in deep knee flexion\par RESISTIVE TESTING: + discomfort with knee ext from deep knee flexion (stretched position), painless knee flexion. \par SPECIAL TESTS: stable v/v stress. painless grind. neg Lachman's. neg ant/post drawer. neg Shaye's. neg Thessaly test. neg Lalitha's & Malacrae's\par NEURO: Normal sensation of LE, DTRs 2+/4 patella and achilles\par PULSES: 2+ DP/PT pulses\par  [de-identified] : The following radiographs were ordered and read by me during this patient's visit. I reviewed each radiograph in detail with the patient and discussed the findings as highlighted below. \par \par 3 views of the right knee were obtained today that show degenerative changes and evidence of mild tricompartmental osteoarthritis.  No fracture, or dislocation seen at this time. There is no malalignment. No other obvious osseous abnormality. Otherwise unremarkable.\par \par

## 2020-07-02 NOTE — RETURN TO WORK/SCHOOL
[FreeTextEntry1] : Wagner was seen today for evaluation of an orthopedic issue.  Please excuse him from work.\par Thank you for your understanding.\par \par Sincerely,\par \par Lico Vega DO, ATC\par Primary Care Sports Medicine\par [facility] [place]\par

## 2020-07-02 NOTE — DISCUSSION/SUMMARY
[de-identified] : Discussed findings of today's exam and possible causes of patient's pain.  Educated patient on their most probable diagnosis of right knee osteoarthritis.  Reviewed possible courses of treatment, and we collaboratively decided best course of treatment at this time will include conservative management.  Patient will be started on a course of physical therapy to restore normal range of motion and strength as tolerated.  We discussed various treatment options and patient would like to proceed with insurance authorization for hyaluronic acid injection therapy.  I discussed with the patient that the most important long-term benefit to decrease the progression of osteoarthritis would be weight loss.  Patient is advised that 80% of weight loss is via nutrition and diet, he is advised to utilize a calorie counting application such as my fitness pal or weight watchers to track oral intake.  Patient is having pain in the proximal thigh as well as the distal lower leg, he is advised that this pain persists despite localized treatment of knee osteoarthritis I would recommend further evaluation of his low back for potential osteoarthritis and intermittent right lower extremity radiculopathy.  Follow up as needed.  Patient appreciates and agrees with current plan.\par \par This note was generated using dragon medical dictation software.  A reasonable effort has been made for proofreading its contents, but typos may still remain.  If there are any questions or points of clarification needed please notify my office.

## 2020-07-02 NOTE — HISTORY OF PRESENT ILLNESS
[de-identified] : Patient is here for right knee pain that began about 3 weeks ago without inciting injury. He states that the pain started in his calf, then went to his hamstring, and is now focused around his knee. He has taken OTC pain medication occasionally and has done some massage at home to try and treat it. The pain is intermittent. He was diagnosed with a knee problem when he was a child. Denies N/T/R/Prior injury. \par \par The patient's past medical history, past surgical history, medications and allergies were reviewed by me today and documented accordingly. In addition, the patient's family and social history, which were noncontributory to this visit, were reviewed also. Intake form was reviewed. The patient has no family history of arthritis.

## 2020-07-09 ENCOUNTER — APPOINTMENT (OUTPATIENT)
Dept: ENDOCRINOLOGY | Facility: CLINIC | Age: 45
End: 2020-07-09

## 2020-07-31 ENCOUNTER — APPOINTMENT (OUTPATIENT)
Dept: ORTHOPEDIC SURGERY | Facility: CLINIC | Age: 45
End: 2020-07-31

## 2020-08-06 ENCOUNTER — APPOINTMENT (OUTPATIENT)
Dept: ORTHOPEDIC SURGERY | Facility: CLINIC | Age: 45
End: 2020-08-06

## 2020-08-13 ENCOUNTER — APPOINTMENT (OUTPATIENT)
Dept: ORTHOPEDIC SURGERY | Facility: CLINIC | Age: 45
End: 2020-08-13

## 2020-09-17 ENCOUNTER — APPOINTMENT (OUTPATIENT)
Dept: ENDOCRINOLOGY | Facility: CLINIC | Age: 45
End: 2020-09-17
Payer: COMMERCIAL

## 2020-09-17 VITALS
TEMPERATURE: 97.8 F | BODY MASS INDEX: 33.13 KG/M2 | HEIGHT: 73 IN | HEART RATE: 76 BPM | DIASTOLIC BLOOD PRESSURE: 70 MMHG | SYSTOLIC BLOOD PRESSURE: 110 MMHG | RESPIRATION RATE: 16 BRPM | OXYGEN SATURATION: 98 % | WEIGHT: 250 LBS

## 2020-09-17 LAB
BASOPHILS # BLD AUTO: 0.02 K/UL
BASOPHILS NFR BLD AUTO: 0.3 %
EOSINOPHIL # BLD AUTO: 0.11 K/UL
EOSINOPHIL NFR BLD AUTO: 1.8 %
HCT VFR BLD CALC: 44.1 %
HGB BLD-MCNC: 14.3 G/DL
IMM GRANULOCYTES NFR BLD AUTO: 0.5 %
LYMPHOCYTES # BLD AUTO: 3.04 K/UL
LYMPHOCYTES NFR BLD AUTO: 49.1 %
MAN DIFF?: NORMAL
MCHC RBC-ENTMCNC: 24.7 PG
MCHC RBC-ENTMCNC: 32.4 GM/DL
MCV RBC AUTO: 76.2 FL
MONOCYTES # BLD AUTO: 0.52 K/UL
MONOCYTES NFR BLD AUTO: 8.4 %
NEUTROPHILS # BLD AUTO: 2.47 K/UL
NEUTROPHILS NFR BLD AUTO: 39.9 %
PLATELET # BLD AUTO: 344 K/UL
RBC # BLD: 5.79 M/UL
RBC # FLD: 13.3 %
WBC # FLD AUTO: 6.19 K/UL

## 2020-09-17 PROCEDURE — 99214 OFFICE O/P EST MOD 30 MIN: CPT | Mod: 25

## 2020-09-17 PROCEDURE — 36415 COLL VENOUS BLD VENIPUNCTURE: CPT

## 2020-09-17 RX ORDER — FLASH GLUCOSE SENSOR
KIT MISCELLANEOUS
Qty: 2 | Refills: 11 | Status: DISCONTINUED | COMMUNITY
Start: 2019-12-09 | End: 2020-09-17

## 2020-09-17 RX ORDER — CYCLOBENZAPRINE HYDROCHLORIDE 10 MG/1
10 TABLET, FILM COATED ORAL
Qty: 5 | Refills: 0 | Status: DISCONTINUED | COMMUNITY
Start: 2020-01-14 | End: 2020-09-17

## 2020-09-17 RX ORDER — MELOXICAM 15 MG/1
15 TABLET ORAL
Qty: 15 | Refills: 0 | Status: DISCONTINUED | COMMUNITY
Start: 2020-01-14 | End: 2020-09-17

## 2020-09-17 RX ORDER — METHIMAZOLE 10 MG/1
10 TABLET ORAL
Qty: 120 | Refills: 5 | Status: DISCONTINUED | COMMUNITY
Start: 2019-10-31 | End: 2020-09-17

## 2020-09-17 RX ORDER — PROPRANOLOL HYDROCHLORIDE 20 MG/1
20 TABLET ORAL 3 TIMES DAILY
Qty: 90 | Refills: 5 | Status: DISCONTINUED | COMMUNITY
Start: 2019-10-22 | End: 2020-09-17

## 2020-09-17 RX ORDER — HYALURONATE SODIUM 20 MG/2 ML
20 SYRINGE (ML) INTRAARTICULAR
Qty: 1 | Refills: 0 | Status: DISCONTINUED | COMMUNITY
Start: 2020-07-13 | End: 2020-09-17

## 2020-09-17 RX ORDER — HYALURONATE SODIUM, STABILIZED 88 MG/4 ML
88 SYRINGE (ML) INTRAARTICULAR
Qty: 1 | Refills: 0 | Status: DISCONTINUED | COMMUNITY
Start: 2020-07-02 | End: 2020-09-17

## 2020-09-17 NOTE — ASSESSMENT
[Methimazole Therapy] : Risks and benefits of methimazole therapy were discussed with the patient,  including rash, liver dysfunction, and agranulocytosis.  Patient was instructed to call the office for flu-like symptoms eg fever and sore-throat [FreeTextEntry1] : Graves disease. Apparently going into remission\par - keep off MMI for now\par - monitor antibodies status\par - labs today\par RTC 3 mos, or sooner prn

## 2020-09-17 NOTE — HISTORY OF PRESENT ILLNESS
[FreeTextEntry1] : 45 year male f/u for hyperthyroidism/Graves\par \par *** Sep 17, 2020 ***\par \par missed f/u appt\par off MMI and inderal since late may. did not repeat labs yet\par feels much better overall. gained some weight\par \par *** Televisit  May 28, 2020 ***\par \par feels much better since stopping MMI\par no more legs pain. gained about 10 lbs\par \par *** Televisit  Apr 07, 2020 ***\par \par on MMI 10 gm qd, inderal 20 mg tid\par feels better overall. not gaining weight anymore\par \par *** Feb 11, 2020 ***\par \par feels well. gained weight\par still on mmi 20 mg bid ("forgot to lower the dose"), inderal 20mg tid\par \par *** Dec 09, 2019 ***\par \par on mmi 20mg bid, inderal 20mg tid\par feels much better overall\par no rpt labs yet\par \par \par HPI:\par He developed "throat irritation" in August, was seen by ENT and treated with Augmentin for a presumed infection. \par Patient denies prior history of thyroid disorders.\par Denies recent URI, exposure to iodine-containing medications, supplements/ IV contrast, PO or IV steroids. \par Denies family h/o thyroid cancer or history of radiation exposure to head and neck area in a childhood.\par Patient complains of 40 lbs weight loss within past 3 months, fatigue, palpitations\par he denies diarrhea, skin/nail changes, hair loss, periorbital edema, anxiety, problems sleeping.\par \par TSH < 0.01,  FT4- 4.5, \par a1c- 6.8\par + TPO ab (1874), neg Tg ab\par + TBII - 4.94\par + TSI- 11.70\par \par Thyroid US (10/1/19)- enlarged heterogeneous thyroid. No nodules\par CT neck (9/17/19)- prominent nasopharyngeal soft tissue, likely residual adenoids

## 2020-09-18 LAB
ALBUMIN SERPL ELPH-MCNC: 4.1 G/DL
ALP BLD-CCNC: 114 U/L
ALT SERPL-CCNC: 15 U/L
AST SERPL-CCNC: 14 U/L
BILIRUB DIRECT SERPL-MCNC: 0.2 MG/DL
BILIRUB INDIRECT SERPL-MCNC: 0.5 MG/DL
BILIRUB SERPL-MCNC: 0.7 MG/DL
PROT SERPL-MCNC: 6.8 G/DL
T3 SERPL-MCNC: 194 NG/DL
T4 FREE SERPL-MCNC: 2 NG/DL
TSH SERPL-ACNC: <0.01 UIU/ML

## 2020-09-20 LAB — TSI ACT/NOR SER: 1.51 IU/L

## 2020-09-22 LAB — TSH RECEPTOR AB: <1.1 IU/L

## 2020-11-09 ENCOUNTER — APPOINTMENT (OUTPATIENT)
Dept: ENDOCRINOLOGY | Facility: CLINIC | Age: 45
End: 2020-11-09
Payer: COMMERCIAL

## 2020-11-09 VITALS
RESPIRATION RATE: 16 BRPM | TEMPERATURE: 98.3 F | OXYGEN SATURATION: 98 % | HEIGHT: 73 IN | HEART RATE: 79 BPM | BODY MASS INDEX: 33.13 KG/M2 | DIASTOLIC BLOOD PRESSURE: 70 MMHG | SYSTOLIC BLOOD PRESSURE: 124 MMHG | WEIGHT: 250 LBS

## 2020-11-09 LAB
ALBUMIN SERPL ELPH-MCNC: 4.4 G/DL
ALP BLD-CCNC: 125 U/L
ALT SERPL-CCNC: 12 U/L
AST SERPL-CCNC: 17 U/L
BASOPHILS # BLD AUTO: 0.04 K/UL
BASOPHILS NFR BLD AUTO: 0.7 %
BILIRUB DIRECT SERPL-MCNC: 0.2 MG/DL
BILIRUB INDIRECT SERPL-MCNC: 0.4 MG/DL
BILIRUB SERPL-MCNC: 0.5 MG/DL
EOSINOPHIL # BLD AUTO: 0.08 K/UL
EOSINOPHIL NFR BLD AUTO: 1.5 %
HCT VFR BLD CALC: 46.7 %
HGB BLD-MCNC: 14.5 G/DL
IMM GRANULOCYTES NFR BLD AUTO: 0.5 %
LYMPHOCYTES # BLD AUTO: 2.61 K/UL
LYMPHOCYTES NFR BLD AUTO: 47.5 %
MAN DIFF?: NORMAL
MCHC RBC-ENTMCNC: 24.4 PG
MCHC RBC-ENTMCNC: 31 GM/DL
MCV RBC AUTO: 78.6 FL
MONOCYTES # BLD AUTO: 0.31 K/UL
MONOCYTES NFR BLD AUTO: 5.6 %
NEUTROPHILS # BLD AUTO: 2.42 K/UL
NEUTROPHILS NFR BLD AUTO: 44.2 %
PLATELET # BLD AUTO: 331 K/UL
PROT SERPL-MCNC: 7.1 G/DL
RBC # BLD: 5.94 M/UL
RBC # FLD: 16 %
T3 SERPL-MCNC: 107 NG/DL
T4 FREE SERPL-MCNC: 1 NG/DL
TSH SERPL-ACNC: 0.14 UIU/ML
WBC # FLD AUTO: 5.49 K/UL

## 2020-11-09 PROCEDURE — 99072 ADDL SUPL MATRL&STAF TM PHE: CPT

## 2020-11-09 PROCEDURE — 36415 COLL VENOUS BLD VENIPUNCTURE: CPT

## 2020-11-09 PROCEDURE — 99213 OFFICE O/P EST LOW 20 MIN: CPT | Mod: 25

## 2020-11-09 NOTE — ASSESSMENT
[Methimazole Therapy] : Risks and benefits of methimazole therapy were discussed with the patient,  including rash, liver dysfunction, and agranulocytosis.  Patient was instructed to call the office for flu-like symptoms eg fever and sore-throat [FreeTextEntry1] : Graves disease. \par - keep on MMI 10 mg qd\par - monitor antibodies status\par - labs today\par once again, available therapeutic approaches to hyperthyroidism, including antithyroidal medications, I-131 therapy, and total thyroidectomy were reviewed. Risk and benefits of each approach were extensively discussed (including, but not limited to permanent hypothyroidism post-thyroidectomy or I-131 therapy, and increased risk of life-threatening neutropenia or liver failure on thionamides).\par RTC 3 mos, or sooner prn

## 2020-11-09 NOTE — HISTORY OF PRESENT ILLNESS
[FreeTextEntry1] : 45 year male f/u for hyperthyroidism/Graves\par \par *** Nov 09, 2020 ***\par \par on MMI 10 mg qd, feels well on this dose. myalgia resolved on a small dose of MMI\par feels that certain food "triggers his thyroid issues". feels fine overall. weight has stabilized\par states that his typical weight before developing thyroid issues was 250-260 lbs\par \par *** Sep 17, 2020 ***\par \par missed f/u appt\par off MMI and inderal since late may. did not repeat labs yet\par feels much better overall. gained some weight\par \par *** Televisit  May 28, 2020 ***\par \par feels much better since stopping MMI\par no more legs pain. gained about 10 lbs\par \par *** Televisit  Apr 07, 2020 ***\par \par on MMI 10 gm qd, inderal 20 mg tid\par feels better overall. not gaining weight anymore\par \par *** Feb 11, 2020 ***\par \par feels well. gained weight\par still on mmi 20 mg bid ("forgot to lower the dose"), inderal 20mg tid\par \par *** Dec 09, 2019 ***\par \par on mmi 20mg bid, inderal 20mg tid\par feels much better overall\par no rpt labs yet\par \par \par HPI:\par He developed "throat irritation" in August, was seen by ENT and treated with Augmentin for a presumed infection. \par Patient denies prior history of thyroid disorders.\par Denies recent URI, exposure to iodine-containing medications, supplements/ IV contrast, PO or IV steroids. \par Denies family h/o thyroid cancer or history of radiation exposure to head and neck area in a childhood.\par Patient complains of 40 lbs weight loss within past 3 months, fatigue, palpitations\par he denies diarrhea, skin/nail changes, hair loss, periorbital edema, anxiety, problems sleeping.\par \par TSH < 0.01,  FT4- 4.5, \par a1c- 6.8\par + TPO ab (1874), neg Tg ab\par + TBII - 4.94\par + TSI- 11.70\par \par Thyroid US (10/1/19)- enlarged heterogeneous thyroid. No nodules\par CT neck (9/17/19)- prominent nasopharyngeal soft tissue, likely residual adenoids

## 2020-11-11 LAB — TSI ACT/NOR SER: 1.03 IU/L

## 2020-11-12 LAB — TSH RECEPTOR AB: <1.1 IU/L

## 2020-12-14 ENCOUNTER — APPOINTMENT (OUTPATIENT)
Dept: OTOLARYNGOLOGY | Facility: CLINIC | Age: 45
End: 2020-12-14

## 2020-12-17 ENCOUNTER — APPOINTMENT (OUTPATIENT)
Dept: ENDOCRINOLOGY | Facility: CLINIC | Age: 45
End: 2020-12-17
Payer: COMMERCIAL

## 2020-12-17 ENCOUNTER — TRANSCRIPTION ENCOUNTER (OUTPATIENT)
Age: 45
End: 2020-12-17

## 2020-12-17 PROCEDURE — 99213 OFFICE O/P EST LOW 20 MIN: CPT | Mod: 95

## 2020-12-17 NOTE — ASSESSMENT
[Methimazole Therapy] : Risks and benefits of methimazole therapy were discussed with the patient,  including rash, liver dysfunction, and agranulocytosis.  Patient was instructed to call the office for flu-like symptoms eg fever and sore-throat [FreeTextEntry1] : Graves disease. \par - adherence is reiterated \par - labs asap\par - we discussed proceeding with ANGUIANO and patient is considering it\par - Risk and benefits of ANGUIANO tx were extensively discussed (including, but not limited to permanent hypothyroidism post-thyroidectomy or I-131 therapy)\par Will decide post labs

## 2020-12-17 NOTE — HISTORY OF PRESENT ILLNESS
[Home] : at home, [unfilled] , at the time of the visit. [Medical Office: ([facility])___] : at the medical office located in  [Verbal consent obtained from patient] : the patient, [unfilled] [FreeTextEntry1] : 45 year male f/u for hyperthyroidism/Graves\par \par *** Televisit  Dec 17, 2020 ***\par \par stopped MMI again about 3 weeks ago b/o "legs were hurting"\par feels fine since stopping the medication\par no recent labs\par \par \par *** Nov 09, 2020 ***\par \par on MMI 10 mg qd, feels well on this dose. myalgia resolved on a small dose of MMI\par feels that certain food "triggers his thyroid issues". feels fine overall. weight has stabilized\par states that his typical weight before developing thyroid issues was 250-260 lbs\par \par *** Sep 17, 2020 ***\par \par missed f/u appt\par off MMI and inderal since late may. did not repeat labs yet\par feels much better overall. gained some weight\par \par *** Televisit  May 28, 2020 ***\par \par feels much better since stopping MMI\par no more legs pain. gained about 10 lbs\par \par *** Televisit  Apr 07, 2020 ***\par \par on MMI 10 gm qd, inderal 20 mg tid\par feels better overall. not gaining weight anymore\par \par *** Feb 11, 2020 ***\par \par feels well. gained weight\par still on mmi 20 mg bid ("forgot to lower the dose"), inderal 20mg tid\par \par *** Dec 09, 2019 ***\par \par on mmi 20mg bid, inderal 20mg tid\par feels much better overall\par no rpt labs yet\par \par \par HPI:\par He developed "throat irritation" in August, was seen by ENT and treated with Augmentin for a presumed infection. \par Patient denies prior history of thyroid disorders.\par Denies recent URI, exposure to iodine-containing medications, supplements/ IV contrast, PO or IV steroids. \par Denies family h/o thyroid cancer or history of radiation exposure to head and neck area in a childhood.\par Patient complains of 40 lbs weight loss within past 3 months, fatigue, palpitations\par he denies diarrhea, skin/nail changes, hair loss, periorbital edema, anxiety, problems sleeping.\par \par TSH < 0.01,  FT4- 4.5, \par a1c- 6.8\par + TPO ab (1874), neg Tg ab\par + TBII - 4.94\par + TSI- 11.70\par \par Thyroid US (10/1/19)- enlarged heterogeneous thyroid. No nodules\par CT neck (9/17/19)- prominent nasopharyngeal soft tissue, likely residual adenoids

## 2020-12-31 ENCOUNTER — TRANSCRIPTION ENCOUNTER (OUTPATIENT)
Age: 45
End: 2020-12-31

## 2020-12-31 LAB
ALBUMIN SERPL ELPH-MCNC: 4.3 G/DL
ALP BLD-CCNC: 114 U/L
ALT SERPL-CCNC: 13 U/L
AST SERPL-CCNC: 13 U/L
BASOPHILS # BLD AUTO: 0.06 K/UL
BASOPHILS NFR BLD AUTO: 0.8 %
BILIRUB DIRECT SERPL-MCNC: 0.2 MG/DL
BILIRUB INDIRECT SERPL-MCNC: 0.4 MG/DL
BILIRUB SERPL-MCNC: 0.6 MG/DL
EOSINOPHIL # BLD AUTO: 0.11 K/UL
EOSINOPHIL NFR BLD AUTO: 1.5 %
HCT VFR BLD CALC: 45.5 %
HGB BLD-MCNC: 14.3 G/DL
IMM GRANULOCYTES NFR BLD AUTO: 0.4 %
LYMPHOCYTES # BLD AUTO: 3.43 K/UL
LYMPHOCYTES NFR BLD AUTO: 46.4 %
MAN DIFF?: NORMAL
MCHC RBC-ENTMCNC: 25 PG
MCHC RBC-ENTMCNC: 31.4 GM/DL
MCV RBC AUTO: 79.4 FL
MONOCYTES # BLD AUTO: 0.58 K/UL
MONOCYTES NFR BLD AUTO: 7.8 %
NEUTROPHILS # BLD AUTO: 3.18 K/UL
NEUTROPHILS NFR BLD AUTO: 43.1 %
PLATELET # BLD AUTO: 323 K/UL
PROT SERPL-MCNC: 7.2 G/DL
RBC # BLD: 5.73 M/UL
RBC # FLD: 15.4 %
T3 SERPL-MCNC: 117 NG/DL
T4 FREE SERPL-MCNC: 1 NG/DL
TSH RECEPTOR AB: <1.1 IU/L
TSH SERPL-ACNC: 0.31 UIU/ML
WBC # FLD AUTO: 7.39 K/UL

## 2021-01-04 LAB — TSI ACT/NOR SER: 0.8 IU/L

## 2021-02-10 ENCOUNTER — APPOINTMENT (OUTPATIENT)
Dept: ENDOCRINOLOGY | Facility: CLINIC | Age: 46
End: 2021-02-10

## 2021-08-12 ENCOUNTER — EMERGENCY (EMERGENCY)
Facility: HOSPITAL | Age: 46
LOS: 1 days | Discharge: HOME | End: 2021-08-12
Attending: EMERGENCY MEDICINE | Admitting: EMERGENCY MEDICINE
Payer: COMMERCIAL

## 2021-08-12 VITALS
SYSTOLIC BLOOD PRESSURE: 162 MMHG | OXYGEN SATURATION: 100 % | HEART RATE: 79 BPM | DIASTOLIC BLOOD PRESSURE: 107 MMHG | HEIGHT: 74 IN | RESPIRATION RATE: 16 BRPM | TEMPERATURE: 98 F

## 2021-08-12 LAB
ALBUMIN SERPL ELPH-MCNC: 4.4 G/DL — SIGNIFICANT CHANGE UP (ref 3.3–5)
ALP SERPL-CCNC: 125 U/L — HIGH (ref 40–120)
ALT FLD-CCNC: 23 U/L — SIGNIFICANT CHANGE UP (ref 4–41)
ANION GAP SERPL CALC-SCNC: 15 MMOL/L — HIGH (ref 7–14)
APPEARANCE UR: CLEAR — SIGNIFICANT CHANGE UP
AST SERPL-CCNC: 16 U/L — SIGNIFICANT CHANGE UP (ref 4–40)
B-OH-BUTYR SERPL-SCNC: <0 MMOL/L — SIGNIFICANT CHANGE UP (ref 0–0.4)
BACTERIA # UR AUTO: NEGATIVE — SIGNIFICANT CHANGE UP
BASOPHILS # BLD AUTO: 0.04 K/UL — SIGNIFICANT CHANGE UP (ref 0–0.2)
BASOPHILS NFR BLD AUTO: 0.6 % — SIGNIFICANT CHANGE UP (ref 0–2)
BILIRUB SERPL-MCNC: 0.4 MG/DL — SIGNIFICANT CHANGE UP (ref 0.2–1.2)
BILIRUB UR-MCNC: NEGATIVE — SIGNIFICANT CHANGE UP
BLOOD GAS VENOUS COMPREHENSIVE RESULT: SIGNIFICANT CHANGE UP
BUN SERPL-MCNC: 16 MG/DL — SIGNIFICANT CHANGE UP (ref 7–23)
CALCIUM SERPL-MCNC: 9.5 MG/DL — SIGNIFICANT CHANGE UP (ref 8.4–10.5)
CHLORIDE SERPL-SCNC: 98 MMOL/L — SIGNIFICANT CHANGE UP (ref 98–107)
CO2 SERPL-SCNC: 22 MMOL/L — SIGNIFICANT CHANGE UP (ref 22–31)
COLOR SPEC: SIGNIFICANT CHANGE UP
CREAT SERPL-MCNC: 1.31 MG/DL — HIGH (ref 0.5–1.3)
DIFF PNL FLD: ABNORMAL
EOSINOPHIL # BLD AUTO: 0.09 K/UL — SIGNIFICANT CHANGE UP (ref 0–0.5)
EOSINOPHIL NFR BLD AUTO: 1.3 % — SIGNIFICANT CHANGE UP (ref 0–6)
EPI CELLS # UR: 0 /HPF — SIGNIFICANT CHANGE UP (ref 0–5)
GLUCOSE SERPL-MCNC: 338 MG/DL — HIGH (ref 70–99)
GLUCOSE UR QL: ABNORMAL
HCT VFR BLD CALC: 41.8 % — SIGNIFICANT CHANGE UP (ref 39–50)
HGB BLD-MCNC: 13.7 G/DL — SIGNIFICANT CHANGE UP (ref 13–17)
HYALINE CASTS # UR AUTO: 0 /LPF — SIGNIFICANT CHANGE UP (ref 0–7)
IANC: 3.57 K/UL — SIGNIFICANT CHANGE UP (ref 1.5–8.5)
IMM GRANULOCYTES NFR BLD AUTO: 0.6 % — SIGNIFICANT CHANGE UP (ref 0–1.5)
KETONES UR-MCNC: NEGATIVE — SIGNIFICANT CHANGE UP
LEUKOCYTE ESTERASE UR-ACNC: NEGATIVE — SIGNIFICANT CHANGE UP
LYMPHOCYTES # BLD AUTO: 2.84 K/UL — SIGNIFICANT CHANGE UP (ref 1–3.3)
LYMPHOCYTES # BLD AUTO: 40.2 % — SIGNIFICANT CHANGE UP (ref 13–44)
MAGNESIUM SERPL-MCNC: 2 MG/DL — SIGNIFICANT CHANGE UP (ref 1.6–2.6)
MCHC RBC-ENTMCNC: 24.8 PG — LOW (ref 27–34)
MCHC RBC-ENTMCNC: 32.8 GM/DL — SIGNIFICANT CHANGE UP (ref 32–36)
MCV RBC AUTO: 75.7 FL — LOW (ref 80–100)
MONOCYTES # BLD AUTO: 0.48 K/UL — SIGNIFICANT CHANGE UP (ref 0–0.9)
MONOCYTES NFR BLD AUTO: 6.8 % — SIGNIFICANT CHANGE UP (ref 2–14)
NEUTROPHILS # BLD AUTO: 3.57 K/UL — SIGNIFICANT CHANGE UP (ref 1.8–7.4)
NEUTROPHILS NFR BLD AUTO: 50.5 % — SIGNIFICANT CHANGE UP (ref 43–77)
NITRITE UR-MCNC: NEGATIVE — SIGNIFICANT CHANGE UP
NRBC # BLD: 0 /100 WBCS — SIGNIFICANT CHANGE UP
NRBC # FLD: 0 K/UL — SIGNIFICANT CHANGE UP
PH UR: 6.5 — SIGNIFICANT CHANGE UP (ref 5–8)
PHOSPHATE SERPL-MCNC: 3.4 MG/DL — SIGNIFICANT CHANGE UP (ref 2.5–4.5)
PLATELET # BLD AUTO: 298 K/UL — SIGNIFICANT CHANGE UP (ref 150–400)
POTASSIUM SERPL-MCNC: 4.1 MMOL/L — SIGNIFICANT CHANGE UP (ref 3.5–5.3)
POTASSIUM SERPL-SCNC: 4.1 MMOL/L — SIGNIFICANT CHANGE UP (ref 3.5–5.3)
PROT SERPL-MCNC: 7.8 G/DL — SIGNIFICANT CHANGE UP (ref 6–8.3)
PROT UR-MCNC: ABNORMAL
RBC # BLD: 5.52 M/UL — SIGNIFICANT CHANGE UP (ref 4.2–5.8)
RBC # FLD: 13.2 % — SIGNIFICANT CHANGE UP (ref 10.3–14.5)
RBC CASTS # UR COMP ASSIST: 7 /HPF — HIGH (ref 0–4)
SODIUM SERPL-SCNC: 135 MMOL/L — SIGNIFICANT CHANGE UP (ref 135–145)
SP GR SPEC: 1.02 — SIGNIFICANT CHANGE UP (ref 1.01–1.02)
UROBILINOGEN FLD QL: SIGNIFICANT CHANGE UP
WBC # BLD: 7.06 K/UL — SIGNIFICANT CHANGE UP (ref 3.8–10.5)
WBC # FLD AUTO: 7.06 K/UL — SIGNIFICANT CHANGE UP (ref 3.8–10.5)
WBC UR QL: 1 /HPF — SIGNIFICANT CHANGE UP (ref 0–5)

## 2021-08-12 PROCEDURE — 71046 X-RAY EXAM CHEST 2 VIEWS: CPT | Mod: 26

## 2021-08-12 PROCEDURE — 70450 CT HEAD/BRAIN W/O DYE: CPT | Mod: 26

## 2021-08-12 PROCEDURE — 99291 CRITICAL CARE FIRST HOUR: CPT

## 2021-08-12 RX ORDER — SODIUM CHLORIDE 9 MG/ML
2000 INJECTION, SOLUTION INTRAVENOUS ONCE
Refills: 0 | Status: COMPLETED | OUTPATIENT
Start: 2021-08-12 | End: 2021-08-12

## 2021-08-12 RX ADMIN — SODIUM CHLORIDE 2000 MILLILITER(S): 9 INJECTION, SOLUTION INTRAVENOUS at 11:42

## 2021-08-12 NOTE — ED ADULT NURSE NOTE - NSIMPLEMENTINTERV_GEN_ALL_ED
Implemented All Universal Safety Interventions:  [place] to call system. Call bell, personal items and telephone within reach. Instruct patient to call for assistance. Room bathroom lighting operational. Non-slip footwear when patient is off stretcher. Physically safe environment: no spills, clutter or unnecessary equipment. Stretcher in lowest position, wheels locked, appropriate side rails in place.

## 2021-08-12 NOTE — ED PROVIDER NOTE - PHYSICAL EXAMINATION
Physical Exam:  General: NAD, Conversive  Eyes: EOMI, Conjunctiva and sclera clear  Neck: No JVD  Lungs: Clear to auscultation bilaterally, no wheeze, no rhonchi  Heart: Normal S1, S2, no murmurs  Abdomen: Soft, nontender, nondistended, no CVA tenderness  Extremities: 2+ peripheral pulses, no edema  Psych: AAO X3  Neurologic: Non-focal, CN II-XII intact, no change in strength X4 extremities, no disdiadokinesia/dysmetria. Decresaed sensation across L. palmar hand

## 2021-08-12 NOTE — STROKE CODE NOTE - DISPOSITION
Patient has been experiencing numbness in 3rd-5th digits x 2 weeks, now radiating up L. arm. Neuro & ED agree, a peripheral etiology is more likely. Patient has been experiencing numbness in 3rd-5th digits x 2 weeks, now radiating up L. arm. Neuro & ED agree, a peripheral etiology is more likely. Outside the window for acute treatments.

## 2021-08-12 NOTE — ED ADULT TRIAGE NOTE - CHIEF COMPLAINT QUOTE
pt c/o numbness to L arm, L side of face as of 630 am this morning. pt reports having difficulty speaking this morning, pt states "my wife told me I kept repeating myself". hx of epilepsy. code stroke initiated.

## 2021-08-12 NOTE — ED PROVIDER NOTE - CARE PLAN
Principal Discharge DX:	Cervical radiculopathy  Secondary Diagnosis:	Hyperglycemia due to diabetes mellitus   1

## 2021-08-12 NOTE — ED PROVIDER NOTE - OBJECTIVE STATEMENT
46 Y M H/O DM II, HTN, presenting with L. hand numbness. 46 Y M H/O DM II, HTN, presenting with L. hand numbness, intermittent L. hand numbness over the last 2 weeks in setting of increased sugary drink consumption, associated with polyuria urinating up to 4-5 times overnight. Distribution is ulnar over fingertips now with palmar involvement. Intermittent blurry vision B/L, with no vision loss. denies any weakness, chills, change in gait, AMS.

## 2021-08-12 NOTE — ED PROVIDER NOTE - NS ED ROS FT
GENERAL: No fever or chills  EYES: + blurring vision B/L  HEENT: No trouble swallowing or speaking  CARDIAC: No chest pain  PULMONARY: No cough or SOB  GI: No abdominal pain, no nausea or no vomiting, no diarrhea or constipation  : No changes in urination  SKIN: No rashes  NEURO: No headache, + L. hand numbness  MSK: No joint pain  Otherwise as HPI or negative.

## 2021-08-12 NOTE — ED PROVIDER NOTE - NSFOLLOWUPINSTRUCTIONS_ED_ALL_ED_FT
Neuropathy    Peripheral neuropathy is a type of nerve damage that affects nerves that carry signals between the spinal cord and other parts of the body. Many things can damage peripheral nerves including vascular disease or diabetes. Signs and symptoms of neuropathy include numbness, tingling, pain, sensitive skin, weakness/paralysis of a body part, muscle twitching, loss of balance, not being able to control your bladder or sexual problems. If you have numbness in your feet, check every day for signs of infection including redness, warmth, and swelling.      Followup with your primary care doctor within 3-4 days to repeat your creatinine, followup with your endocrinologist for further diabetes management, avoid heavily sugary drink and foods.    SEEK IMMEDIATE MEDICAL CARE IF YOU HAVE ANY OF THE FOLLOWING SYMPTOMS: an injury or infection that is not healing, dizziness, vomiting, paralysis, chest pain, or trouble breathing.    Type 2 Diabetes Mellitus  Caring for yourself after you have been diagnosed with type 2 diabetes (type 2 diabetes mellitus) means keeping your blood sugar (glucose) under control with a balance of:  - Nutrition.  - Exercise.  - Lifestyle changes.  - Insulin, if necessary.    Support from your team of health care providers and others.    The following information explains what you need to know to manage your diabetes at home.  What are the risks?  Having diabetes can put you at risk for other long-term (chronic) conditions, such as heart disease and kidney disease. Your health care provider may prescribe medicines to help prevent complications from diabetes. These medicines may include:  - Aspirin.  - Medicine to lower cholesterol.  - Medicine to control blood pressure.    How to monitor blood glucose     Check your blood glucose every day, as often as told by your health care provider.Have your A1c (hemoglobin A1c) level checked two or more times a year, or as often as told by your health care provider.Your health care provider will set individualized treatment goals for you. Generally, the goal of treatment is to maintain the following blood glucose levels:  - Before meals (preprandial): 80–130 mg/dL (4.4–7.2 mmol/L).  - After meals (postprandial): below 180 mg/dL (10 mmol/L).  - A1c level: less than 7%.    How to manage hyperglycemia and hypoglycemia    Hyperglycemia symptoms     Hyperglycemia, also called high blood glucose, occurs when blood glucose is too high. Make sure you know the early signs of hyperglycemia, such as:  - Increased thirst.  - Hunger.  - Feeling very tired.  - Needing to urinate more often than usual.  - Blurry vision.    Hypoglycemia symptoms    Hypoglycemia, also called low blood glucose, occurs with a blood glucose level at or below 70 mg/dL (3.9 mmol/L). The risk for hypoglycemia increases during or after exercise, during sleep, during illness, and when skipping meals or not eating for a long time (fasting).    It is important to know the symptoms of hypoglycemia and treat it right away. Always have a 15-gram rapid-acting carbohydrate snack with you to treat low blood glucose. Family members and close friends should also know the symptoms and should understand how to treat hypoglycemia, in case you are not able to treat yourself.     Symptoms may include:  - Hunger.  - Anxiety.  - Sweating and feeling clammy.  - Confusion.  - Dizziness or feeling light-headed.  - Sleepiness.  - Nausea.  - Increased heart rate.  - Headache.  - Blurry vision.  - Irritability.  - A change in coordination.  - Tingling or numbness around the mouth, lips, or tongue.  - Restless sleep.  - Fainting.  - Seizure.    Treating hypoglycemia  If you are alert and able to swallow safely, follow the 15:15 rule:  - Take 15 grams of a rapid-acting carbohydrate. Talk with your health care provider about how much you should take.  Rapid-acting options include:  - Glucose pills (take 15 grams).  - 6–8 pieces of hard candy.  - 4–6 oz (120–150 mL) of fruit juice.  - 4–6 oz (120–150 mL) of regular (not diet) soda.  - 1 Tbsp (15 mL) honey or sugar.  - Check your blood glucose 15 minutes after you take the carbohydrate.  - If the repeat blood glucose level is still at or below 70 mg/dL (3.9 mmol/L), take 15 grams of a carbohydrate again.  - If your blood glucose level does not increase above 70 mg/dL (3.9 mmol/L) after 3 tries, seek emergency medical care.  - After your blood glucose level returns to normal, eat a meal or a snack within 1 hour.     Treating severe hypoglycemia  Severe hypoglycemia is when your blood glucose level is at or below 54 mg/dL (3 mmol/L). Severe hypoglycemia is an emergency. Do not wait to see if the symptoms will go away. Get medical help right away. Call your local emergency services (911 in the [place]    If you have severe hypoglycemia and you cannot eat or drink, you may need an injection of glucagon. A family member or close friend should learn how to check your blood glucose and how to give you a glucagon injection. Ask your health care provider if you need to have an emergency glucagon injection kit available.    Severe hypoglycemia may need to be treated in a hospital. The treatment may include getting glucose through an IV. You may also need treatment for the cause of your hypoglycemia.  Follow these instructions at home:  Take diabetes medicines as told     If your health care provider prescribed insulin or diabetes medicines, take them every day.    Do not run out of insulin or other diabetes medicines that you take. Plan ahead so you always have these available.    If you use insulin, adjust your dosage based on how physically active you are and what foods you eat. Your health care provider will tell you how to adjust your dosage.    Make healthy food choices      The things that you eat and drink affect your blood glucose and your insulin dosage. Making good choices helps to control your diabetes and prevent other health problems. A healthy meal plan includes eating lean proteins, complex carbohydrates, fresh fruits and vegetables, low-fat dairy products, and healthy fats.    Make an appointment to see a diet and nutrition specialist (registered dietitian) to help you create an eating plan that is right for you.     Make sure that you:  Follow instructions from your health care provider about eating or drinking restrictions.  Drink enough fluid to keep your urine pale yellow.  Keep a record of the carbohydrates that you eat. Do this by reading food labels and learning the standard serving sizes of foods.  Follow your sick day plan whenever you cannot eat or drink as usual. Make this plan in advance with your health care provider.  Stay active     Exercise regularly, as told by your health care provider. This may include:  - Stretching and doing strength exercises, such as yoga or weightlifting, 2 or more times a week.  - Doing 150 minutes or more of moderate-intensity or vigorous-intensity exercise each week. This could be brisk walking, biking, or water aerobics.  - Spread out your activity over 3 or more days of the week.  - Do not go more than 2 days in a row without doing some kind of physical activity.  - When you start a new exercise or activity, work with your health care provider to adjust your insulin, medicines, or food intake as needed.    Make healthy lifestyle choices    Do not use any tobacco products, such as cigarettes, chewing tobacco, and e-cigarettes. If you need help quitting, ask your health care provider.    If your health care provider says that alcohol is safe for you, limit alcohol intake to no more than 1 drink per day for nonpregnant women and 2 drinks per day for men. One drink equals 12 oz of beer (355 mL), 5 oz of wine (148 mL), or 1½ oz of hard liquor (44 mL).Learn to manage stress. If you need help with this, ask your health care provider.    Care for your body    Keep your immunizations up to date. In addition to getting vaccinations as told by your health care provider, it is recommended that you get vaccinated against the following illnesses:  - The flu (influenza). Get a flu shot every year.  - Pneumonia.  - Hepatitis B.  - Schedule an eye exam soon after your diagnosis, and then one time every year after that.  - Check your skin and feet every day for cuts, bruises, redness, blisters, or sores. Schedule a foot exam with your health care provider once every year.Brush your teeth and gums two times a day, and floss one or more times a day. Visit your dentist one or more times every 6 months.  - Maintain a healthy weight.    General instructions    - Take over-the-counter and prescription medicines only as told by your health care provider.  - Share your diabetes management plan with people in your workplace, school, and household.  - Carry a medical alert card or wear medical alert jewelry.  - Keep all follow-up visits as told by your health care provider. This is important.  - Questions to ask your health care provider    Do I need to meet with a diabetes educator?  Where can I find a support group for people with diabetes?    For more information about diabetes, visit:  American Diabetes Association (ADA): www.diabetes.org  American Association of Diabetes Educators (AADE): www.diabeteseducator.org    Summary    Caring for yourself after you have been diagnosed with (type 2 diabetes mellitus) means keeping your blood sugar (glucose) under control with a balance of nutrition, exercise, lifestyle changes, and medicine.    Check your blood glucose every day, as often as told by your health care provider.    Having diabetes can put you at risk for other long-term (chronic) conditions, such as heart disease and kidney disease. Your health care provider may prescribe medicines to help prevent complications from diabetes.    Keep all follow-up visits as told by your health care provider.     This is important.    This information is not intended to replace advice given to you by your health care provider. Make sure you discuss any questions you have with your health care provider.

## 2021-08-12 NOTE — ED PROVIDER NOTE - PROGRESS NOTE DETAILS
Wolf Seymour MD:  Patient evaluated for code stroke, symptoms currently non-consistent with central vascular pathology in setting of 2 weeks of intermittent hand numbness, glucose 312, more consistent with metabolic vs. musculoskeletal. Code stroke cancelled. Wolf Seymour MD:  Discussed results with patient, offered to stay for repeat CMP for creatinine function, further endocrinology workup for DM. Patient states will follow up with PMD in 2-3 days for rechecking creatinine, has appointment with endocrinologist in 4 days, will followup on A1C results and further DM workup. Discussed return precautions.

## 2021-08-12 NOTE — ED PROVIDER NOTE - ATTENDING CONTRIBUTION TO CARE
I performed a face-to-face evaluation of the patient and performed a history and physical examination. I agree with the history and physical examination.    Aniya: 2 wks L ulnar-distribution numbness and pain L anterior shoulder, no weakness, worse at night. Awakening 5-6 times/night to urinate (no pain/F). Noted to have BG ~300 here (no h/o DM). H/o hyperthyroid (Methimazole). No HA or other neuro Sx. PE notable only for decreased sensation L ulnar side of hand. Also, 2 wks (B) distance blurry vision (no curtain-like sensation). DDx: Likely cervical radiculopathy affecting ulnar nerve. Also, new-onset DM (polyuria, blurry vision, high BG). Check labs, EKG, A1c. CDU for DM teaching and cervical MRI.    I have personally provided the amount of critical care time documented below, excluding time spent on separate procedures. I spoke with the consulting service or read their note/recommendations.

## 2021-08-12 NOTE — ED ADULT NURSE NOTE - OBJECTIVE STATEMENT
pt received to room 10, a&ox 4, ambulatory, pmh of epilepsy, p/w vision changes and left sided numbness/tingling. Pt breathing even and unlabored on room air. NSR on cardiac monitor. Denies fever, chills, cough, SOB, chest pain, palpitations, dizziness, N/V/D, constipation. Right 18G IV placed by facilitator RN. Labs collected and sent. pending results/dispo. pt educated on fall precautions and confirms understanding via teach back method. Stretcher locked in lowest position with siderails up x2. Call bell and personal items within reach.

## 2021-08-12 NOTE — ED PROVIDER NOTE - CLINICAL SUMMARY MEDICAL DECISION MAKING FREE TEXT BOX
Aniya: 2 wks L ulnar-distribution numbness and pain L anterior shoulder, no weakness, worse at night. Awakening 5-6 times/night to urinate (no pain/F). Noted to have BG ~300 here (no h/o DM). H/o hyperthyroid (Methimazole). No HA or other neuro Sx. PE notable only for decreased sensation L ulnar side of hand. Also, 2 wks (B) distance blurry vision (no curtain-like sensation). DDx: Likely cervical radiculopathy affecting ulnar nerve. Also, new-onset DM (polyuria, blurry vision, high BG). Check labs, EKG, A1c. CDU for DM teaching and cervical MRI.

## 2021-08-12 NOTE — ED PROVIDER NOTE - PATIENT PORTAL LINK FT
You can access the FollowMyHealth Patient Portal offered by [facility] by registering at the following website: [email or web address]. By joining [employer]’s FollowMyHealth portal, you will also be able to view your health information using other applications (apps) compatible with our system.

## 2021-08-13 ENCOUNTER — APPOINTMENT (OUTPATIENT)
Dept: FAMILY MEDICINE | Facility: CLINIC | Age: 46
End: 2021-08-13
Payer: COMMERCIAL

## 2021-08-13 VITALS
OXYGEN SATURATION: 97 % | DIASTOLIC BLOOD PRESSURE: 82 MMHG | BODY MASS INDEX: 33.8 KG/M2 | HEIGHT: 73 IN | WEIGHT: 255 LBS | SYSTOLIC BLOOD PRESSURE: 120 MMHG | TEMPERATURE: 98 F | HEART RATE: 67 BPM

## 2021-08-13 PROCEDURE — 99214 OFFICE O/P EST MOD 30 MIN: CPT

## 2021-08-13 NOTE — ASSESSMENT
[FreeTextEntry1] : diabetes\par The diagnosis of diabetes is established with this patient. Previous blood work was reviewed prior to the visit and with the patient at time of visit.  Blood work values were explained as well.  Blood work was drawn in office and results will be reviewed and followed. The patient was counseled on using a low sugar and carbohydrate diet.  Patient was advised to eat small meals high in protein and to exercise regularly. Patient as advised to take all medications as prescribed, compliance with medications was reinforced.  Patient should follow up with yearly podiatry and opthalmology visits. Patient was advised to call office  or go to the ER immediately if experiences any nausea, lightheadedness or for any other issues.\par \par hospital ER visit for elevated sugars\par sugar was 314 and a1c was 10, vision changes\par \par patient has been drinking iced tea and sugary drinks\par \par will start metformin, titrate up\par went over titration schedule\par \par will fu with endo on monday\par \par hyperthyroid\par was recently decreased on meds, feeling okay, seeing endo morning\par \par

## 2021-08-13 NOTE — PHYSICAL EXAM
[Well Nourished] : well nourished [Well Developed] : well developed [Clear to Auscultation] : lungs were clear to auscultation bilaterally [Regular Rhythm] : with a regular rhythm [Normal S1, S2] : normal S1 and S2 [de-identified] : left upper trap spasm

## 2021-08-13 NOTE — HISTORY OF PRESENT ILLNESS
[de-identified] : 46 year old male is here for a followup visit for after going to the hospital for pain, his sugars were upper 300's, a1c 10\par

## 2021-08-16 ENCOUNTER — APPOINTMENT (OUTPATIENT)
Dept: ENDOCRINOLOGY | Facility: CLINIC | Age: 46
End: 2021-08-16
Payer: COMMERCIAL

## 2021-08-16 VITALS
HEIGHT: 73 IN | DIASTOLIC BLOOD PRESSURE: 70 MMHG | RESPIRATION RATE: 16 BRPM | OXYGEN SATURATION: 98 % | WEIGHT: 255 LBS | SYSTOLIC BLOOD PRESSURE: 120 MMHG | TEMPERATURE: 98 F | HEART RATE: 70 BPM | BODY MASS INDEX: 33.8 KG/M2

## 2021-08-16 LAB — GLUCOSE BLDC GLUCOMTR-MCNC: 226

## 2021-08-16 PROCEDURE — 82962 GLUCOSE BLOOD TEST: CPT

## 2021-08-16 PROCEDURE — 36415 COLL VENOUS BLD VENIPUNCTURE: CPT

## 2021-08-16 PROCEDURE — 99215 OFFICE O/P EST HI 40 MIN: CPT | Mod: 25

## 2021-08-16 NOTE — HISTORY OF PRESENT ILLNESS
[FreeTextEntry1] : 46 year male f/u for hyperthyroidism/Graves\par \par *** Aug 16, 2021 ***\par \par missed f/u appts\par diet is much worse past several months- very high in carbs (soda, donuts, fast food)\par FS in 300's, polyuria- went to er last week, a1c- 10.8. no tft's checked\par saw PCP last week- started on metformin 500 mg, taking once a day only\par taking MMI 5 mg qod\par \par *** Televisit  Dec 17, 2020 ***\par \par stopped MMI again about 3 weeks ago b/o "legs were hurting"\par feels fine since stopping the medication\par no recent labs\par \par \par *** Nov 09, 2020 ***\par \par on MMI 10 mg qd, feels well on this dose. myalgia resolved on a small dose of MMI\par feels that certain food "triggers his thyroid issues". feels fine overall. weight has stabilized\par states that his typical weight before developing thyroid issues was 250-260 lbs\par \par *** Sep 17, 2020 ***\par \par missed f/u appt\par off MMI and inderal since late may. did not repeat labs yet\par feels much better overall. gained some weight\par \par *** Televisit  May 28, 2020 ***\par \par feels much better since stopping MMI\par no more legs pain. gained about 10 lbs\par \par *** Televisit  Apr 07, 2020 ***\par \par on MMI 10 gm qd, inderal 20 mg tid\par feels better overall. not gaining weight anymore\par \par *** Feb 11, 2020 ***\par \par feels well. gained weight\par still on mmi 20 mg bid ("forgot to lower the dose"), inderal 20mg tid\par \par *** Dec 09, 2019 ***\par \par on mmi 20mg bid, inderal 20mg tid\par feels much better overall\par no rpt labs yet\par \par \par HPI:\par He developed "throat irritation" in August, was seen by ENT and treated with Augmentin for a presumed infection. \par Patient denies prior history of thyroid disorders.\par Denies recent URI, exposure to iodine-containing medications, supplements/ IV contrast, PO or IV steroids. \par Denies family h/o thyroid cancer or history of radiation exposure to head and neck area in a childhood.\par Patient complains of 40 lbs weight loss within past 3 months, fatigue, palpitations\par he denies diarrhea, skin/nail changes, hair loss, periorbital edema, anxiety, problems sleeping.\par \par TSH < 0.01,  FT4- 4.5, \par a1c- 6.8\par + TPO ab (1874), neg Tg ab\par + TBII - 4.94\par + TSI- 11.70\par \par Thyroid US (10/1/19)- enlarged heterogeneous thyroid. No nodules\par CT neck (9/17/19)- prominent nasopharyngeal soft tissue, likely residual adenoids

## 2021-08-16 NOTE — ASSESSMENT
[Diabetes Foot Care] : diabetes foot care [Long Term Vascular Complications] : long term vascular complications of diabetes [Carbohydrate Consistent Diet] : carbohydrate consistent diet [Importance of Diet and Exercise] : importance of diet and exercise to improve glycemic control, achieve weight loss and improve cardiovascular health [Exercise/Effect on Glucose] : exercise/effect on glucose [Hypoglycemia Management] : hypoglycemia management [Glucagon Use] : glucagon use [Self Monitoring of Blood Glucose] : self monitoring of blood glucose [Retinopathy Screening] : Patient was referred to ophthalmology for retinopathy screening [Methimazole Therapy] : Risks and benefits of methimazole therapy were discussed with the patient,  including rash, liver dysfunction, and agranulocytosis.  Patient was instructed to call the office for flu-like symptoms eg fever and sore-throat [FreeTextEntry1] : 1. Graves disease. \par - adherence is reiterated \par - tft's today\par - we discussed proceeding with ANGUIANO and patient is considering it\par - Risk and benefits of ANGUIANO tx were extensively discussed (including, but not limited to permanent hypothyroidism post-thyroidectomy or I-131 therapy)\par - cont MMI 5 mg qod for now\par \par 2. DM2, uncontrolled\par Current approaches to diabetes management are discussed with the patient. \par Target ranges for blood sugar, blood pressure and cholesterol reviewed, and risk reduction strategies verified. \par Hypoglycemia precautions reviewed with the patient. \par Suggested extensive diabetes education program, including nutritional and diabetes teaching and evaluation. \par Proper dietary restrictions and exercise routines discussed. \par Glucometer/SMBG and log book charting discussed.\par - jani 2\par - increase metformin 500 mg tid\par - Rybelsus 3 mg qd and uptitrate as directed\par - monitor lipids, urine m/alb\par RTC 3 mos

## 2021-08-17 ENCOUNTER — TRANSCRIPTION ENCOUNTER (OUTPATIENT)
Age: 46
End: 2021-08-17

## 2021-08-17 LAB
ALBUMIN SERPL ELPH-MCNC: 4.4 G/DL
ALP BLD-CCNC: 97 U/L
ALT SERPL-CCNC: 12 U/L
ANION GAP SERPL CALC-SCNC: 16 MMOL/L
AST SERPL-CCNC: 17 U/L
BILIRUB SERPL-MCNC: 0.6 MG/DL
BUN SERPL-MCNC: 13 MG/DL
CALCIUM SERPL-MCNC: 9.2 MG/DL
CHLORIDE SERPL-SCNC: 102 MMOL/L
CHOLEST SERPL-MCNC: 196 MG/DL
CO2 SERPL-SCNC: 20 MMOL/L
CREAT SERPL-MCNC: 1.21 MG/DL
CREAT SPEC-SCNC: 165 MG/DL
FOLATE SERPL-MCNC: 11.4 NG/ML
GLUCOSE SERPL-MCNC: 182 MG/DL
HDLC SERPL-MCNC: 32 MG/DL
LDLC SERPL CALC-MCNC: 140 MG/DL
MICROALBUMIN 24H UR DL<=1MG/L-MCNC: 1.8 MG/DL
MICROALBUMIN/CREAT 24H UR-RTO: 11 MG/G
NONHDLC SERPL-MCNC: 164 MG/DL
POTASSIUM SERPL-SCNC: 4.3 MMOL/L
PROT SERPL-MCNC: 7.5 G/DL
SODIUM SERPL-SCNC: 138 MMOL/L
T3 SERPL-MCNC: 113 NG/DL
T4 FREE SERPL-MCNC: 1.3 NG/DL
TRIGL SERPL-MCNC: 120 MG/DL
TSH SERPL-ACNC: 0.87 UIU/ML
VIT B12 SERPL-MCNC: 493 PG/ML

## 2021-08-18 LAB — TSI ACT/NOR SER: 0.36 IU/L

## 2021-08-19 LAB — TSH RECEPTOR AB: <1.1 IU/L

## 2021-08-30 ENCOUNTER — APPOINTMENT (OUTPATIENT)
Dept: ENDOCRINOLOGY | Facility: CLINIC | Age: 46
End: 2021-08-30

## 2021-09-10 ENCOUNTER — APPOINTMENT (OUTPATIENT)
Dept: FAMILY MEDICINE | Facility: CLINIC | Age: 46
End: 2021-09-10

## 2021-11-16 ENCOUNTER — APPOINTMENT (OUTPATIENT)
Dept: ENDOCRINOLOGY | Facility: CLINIC | Age: 46
End: 2021-11-16

## 2022-02-08 ENCOUNTER — APPOINTMENT (OUTPATIENT)
Dept: FAMILY MEDICINE | Facility: CLINIC | Age: 47
End: 2022-02-08

## 2022-03-11 RX ORDER — METHIMAZOLE 5 MG/1
5 TABLET ORAL
Qty: 30 | Refills: 6 | Status: ACTIVE | COMMUNITY
Start: 2020-09-18 | End: 1900-01-01

## 2022-03-15 ENCOUNTER — APPOINTMENT (OUTPATIENT)
Dept: ENDOCRINOLOGY | Facility: CLINIC | Age: 47
End: 2022-03-15
Payer: COMMERCIAL

## 2022-03-15 VITALS
HEART RATE: 80 BPM | HEIGHT: 73 IN | SYSTOLIC BLOOD PRESSURE: 105 MMHG | DIASTOLIC BLOOD PRESSURE: 60 MMHG | WEIGHT: 255 LBS | BODY MASS INDEX: 33.8 KG/M2 | RESPIRATION RATE: 16 BRPM | OXYGEN SATURATION: 98 % | TEMPERATURE: 98 F

## 2022-03-15 LAB — GLUCOSE BLDC GLUCOMTR-MCNC: 117

## 2022-03-15 PROCEDURE — 36415 COLL VENOUS BLD VENIPUNCTURE: CPT

## 2022-03-15 PROCEDURE — 99214 OFFICE O/P EST MOD 30 MIN: CPT | Mod: 25

## 2022-03-15 PROCEDURE — 82962 GLUCOSE BLOOD TEST: CPT

## 2022-03-15 RX ORDER — METFORMIN HYDROCHLORIDE 500 MG/1
500 TABLET, COATED ORAL
Qty: 270 | Refills: 1 | Status: DISCONTINUED | COMMUNITY
Start: 2019-10-22 | End: 2022-03-15

## 2022-03-15 RX ORDER — ORAL SEMAGLUTIDE 7 MG/1
7 TABLET ORAL
Qty: 1 | Refills: 6 | Status: DISCONTINUED | COMMUNITY
Start: 2021-08-16 | End: 2022-03-15

## 2022-03-15 NOTE — ASSESSMENT
[Diabetes Foot Care] : diabetes foot care [Long Term Vascular Complications] : long term vascular complications of diabetes [Carbohydrate Consistent Diet] : carbohydrate consistent diet [Importance of Diet and Exercise] : importance of diet and exercise to improve glycemic control, achieve weight loss and improve cardiovascular health [Exercise/Effect on Glucose] : exercise/effect on glucose [Hypoglycemia Management] : hypoglycemia management [Glucagon Use] : glucagon use [Self Monitoring of Blood Glucose] : self monitoring of blood glucose [Retinopathy Screening] : Patient was referred to ophthalmology for retinopathy screening [FreeTextEntry1] : 1. Graves disease. \par - adherence is reiterated \par - tft's today\par - we discussed proceeding with ANGUIANO and patient is considering it\par - Risk and benefits of ANGUIANO tx were extensively discussed (including, but not limited to permanent hypothyroidism post-thyroidectomy or I-131 therapy)\par - not sure why he increased the dose of  MMI  (prev on 5 mg qod). will likely adjust post labs\par \par 2. T2DM\par Current approaches to diabetes management are discussed with the patient. \par Target ranges for blood sugar, blood pressure and cholesterol reviewed, and risk reduction strategies verified. \par Hypoglycemia precautions reviewed with the patient. \par Suggested extensive diabetes education program, including nutritional and diabetes teaching and evaluation. \par Proper dietary restrictions and exercise routines discussed. \par Glucometer/SMBG and log book charting discussed.\par - resume  jani 2\par - change metformin er 500 mg 2 tabs qd\par - d/c rybelsus\par - trial of Trulicity 0.75 mg qw\par - monitor lipids, urine m/alb\par RTC 3 mos

## 2022-03-15 NOTE — HISTORY OF PRESENT ILLNESS
[FreeTextEntry1] : 46 year male f/u for hyperthyroidism/Graves\par \par *** Mar 15, 2022 ***\par \par missed f/u visits\par stopped Rybelsus b/o GI distress\par taking metformin  500 mg bid only (diarrhea on 3 pills), MMI 5 mg BID (? why the dose was increased)\par stopped using Jalen 2 few weeks ago\par prior to that reports fbs- 90's\par \par *** Aug 16, 2021 ***\par \par missed f/u appts\par diet is much worse past several months- very high in carbs (soda, donuts, fast food)\par FS in 300's, polyuria- went to er last week, a1c- 10.8. no tft's checked\par saw PCP last week- started on metformin 500 mg, taking once a day only\par taking MMI 5 mg qod\par \par *** Televisit  Dec 17, 2020 ***\par \par stopped MMI again about 3 weeks ago b/o "legs were hurting"\par feels fine since stopping the medication\par no recent labs\par \par \par *** Nov 09, 2020 ***\par \par on MMI 10 mg qd, feels well on this dose. myalgia resolved on a small dose of MMI\par feels that certain food "triggers his thyroid issues". feels fine overall. weight has stabilized\par states that his typical weight before developing thyroid issues was 250-260 lbs\par \par *** Sep 17, 2020 ***\par \par missed f/u appt\par off MMI and inderal since late may. did not repeat labs yet\par feels much better overall. gained some weight\par \par *** Televisit  May 28, 2020 ***\par \par feels much better since stopping MMI\par no more legs pain. gained about 10 lbs\par \par *** Televisit  Apr 07, 2020 ***\par \par on MMI 10 gm qd, inderal 20 mg tid\par feels better overall. not gaining weight anymore\par \par *** Feb 11, 2020 ***\par \par feels well. gained weight\par still on mmi 20 mg bid ("forgot to lower the dose"), inderal 20mg tid\par \par *** Dec 09, 2019 ***\par \par on mmi 20mg bid, inderal 20mg tid\par feels much better overall\par no rpt labs yet\par \par \par HPI:\par He developed "throat irritation" in August, was seen by ENT and treated with Augmentin for a presumed infection. \par Patient denies prior history of thyroid disorders.\par Denies recent URI, exposure to iodine-containing medications, supplements/ IV contrast, PO or IV steroids. \par Denies family h/o thyroid cancer or history of radiation exposure to head and neck area in a childhood.\par Patient complains of 40 lbs weight loss within past 3 months, fatigue, palpitations\par he denies diarrhea, skin/nail changes, hair loss, periorbital edema, anxiety, problems sleeping.\par \par TSH < 0.01,  FT4- 4.5, \par a1c- 6.8\par + TPO ab (1874), neg Tg ab\par + TBII - 4.94\par + TSI- 11.70\par \par Thyroid US (10/1/19)- enlarged heterogeneous thyroid. No nodules\par CT neck (9/17/19)- prominent nasopharyngeal soft tissue, likely residual adenoids

## 2022-03-17 ENCOUNTER — TRANSCRIPTION ENCOUNTER (OUTPATIENT)
Age: 47
End: 2022-03-17

## 2022-03-17 LAB
25(OH)D3 SERPL-MCNC: 10.2 NG/ML
ALBUMIN SERPL ELPH-MCNC: 4.6 G/DL
ALP BLD-CCNC: 78 U/L
ALT SERPL-CCNC: 14 U/L
ANION GAP SERPL CALC-SCNC: 14 MMOL/L
AST SERPL-CCNC: 16 U/L
BASOPHILS # BLD AUTO: 0.06 K/UL
BASOPHILS NFR BLD AUTO: 0.9 %
BILIRUB SERPL-MCNC: 0.4 MG/DL
BUN SERPL-MCNC: 14 MG/DL
CALCIUM SERPL-MCNC: 9.9 MG/DL
CHLORIDE SERPL-SCNC: 101 MMOL/L
CHOLEST SERPL-MCNC: 186 MG/DL
CO2 SERPL-SCNC: 23 MMOL/L
CREAT SERPL-MCNC: 1.2 MG/DL
CREAT SPEC-SCNC: 129 MG/DL
EGFR: 76 ML/MIN/1.73M2
EOSINOPHIL # BLD AUTO: 0.12 K/UL
EOSINOPHIL NFR BLD AUTO: 1.8 %
ESTIMATED AVERAGE GLUCOSE: 160 MG/DL
FOLATE SERPL-MCNC: 9.1 NG/ML
FRUCTOSAMINE SERPL-MCNC: 290 UMOL/L
GLUCOSE SERPL-MCNC: 109 MG/DL
HBA1C MFR BLD HPLC: 7.2 %
HCT VFR BLD CALC: 47.9 %
HDLC SERPL-MCNC: 29 MG/DL
HGB BLD-MCNC: 15 G/DL
IMM GRANULOCYTES NFR BLD AUTO: 0.6 %
LDLC SERPL CALC-MCNC: 78 MG/DL
LYMPHOCYTES # BLD AUTO: 3.19 K/UL
LYMPHOCYTES NFR BLD AUTO: 48.5 %
MAN DIFF?: NORMAL
MCHC RBC-ENTMCNC: 24.7 PG
MCHC RBC-ENTMCNC: 31.3 GM/DL
MCV RBC AUTO: 78.9 FL
MICROALBUMIN 24H UR DL<=1MG/L-MCNC: <1.2 MG/DL
MICROALBUMIN/CREAT 24H UR-RTO: NORMAL MG/G
MONOCYTES # BLD AUTO: 0.46 K/UL
MONOCYTES NFR BLD AUTO: 7 %
NEUTROPHILS # BLD AUTO: 2.71 K/UL
NEUTROPHILS NFR BLD AUTO: 41.2 %
NONHDLC SERPL-MCNC: 157 MG/DL
PLATELET # BLD AUTO: 356 K/UL
POTASSIUM SERPL-SCNC: 4.6 MMOL/L
PROT SERPL-MCNC: 7.6 G/DL
RBC # BLD: 6.07 M/UL
RBC # FLD: 15.1 %
SODIUM SERPL-SCNC: 139 MMOL/L
T3 SERPL-MCNC: 118 NG/DL
T4 FREE SERPL-MCNC: 1.3 NG/DL
TRIGL SERPL-MCNC: 396 MG/DL
TSH SERPL-ACNC: 1.16 UIU/ML
TSI ACT/NOR SER: 0.19 IU/L
VIT B12 SERPL-MCNC: 371 PG/ML
WBC # FLD AUTO: 6.58 K/UL

## 2022-03-17 RX ORDER — ERGOCALCIFEROL 1.25 MG/1
1.25 MG CAPSULE ORAL
Qty: 16 | Refills: 2 | Status: ACTIVE | COMMUNITY
Start: 2022-03-17 | End: 1900-01-01

## 2022-03-18 LAB — TSH RECEPTOR AB: <1.1 IU/L

## 2022-03-21 ENCOUNTER — NON-APPOINTMENT (OUTPATIENT)
Age: 47
End: 2022-03-21

## 2022-04-04 ENCOUNTER — APPOINTMENT (OUTPATIENT)
Dept: FAMILY MEDICINE | Facility: CLINIC | Age: 47
End: 2022-04-04

## 2022-05-02 ENCOUNTER — APPOINTMENT (OUTPATIENT)
Dept: FAMILY MEDICINE | Facility: CLINIC | Age: 47
End: 2022-05-02

## 2022-07-07 ENCOUNTER — APPOINTMENT (OUTPATIENT)
Dept: ENDOCRINOLOGY | Facility: CLINIC | Age: 47
End: 2022-07-07

## 2022-07-07 VITALS
OXYGEN SATURATION: 98 % | RESPIRATION RATE: 16 BRPM | WEIGHT: 256 LBS | TEMPERATURE: 98 F | DIASTOLIC BLOOD PRESSURE: 72 MMHG | HEART RATE: 77 BPM | BODY MASS INDEX: 33.93 KG/M2 | SYSTOLIC BLOOD PRESSURE: 130 MMHG | HEIGHT: 73 IN

## 2022-07-07 LAB
BASOPHILS # BLD AUTO: 0.05 K/UL
BASOPHILS NFR BLD AUTO: 0.9 %
EOSINOPHIL # BLD AUTO: 0.16 K/UL
EOSINOPHIL NFR BLD AUTO: 2.9 %
GLUCOSE BLDC GLUCOMTR-MCNC: 125
HCT VFR BLD CALC: 46.3 %
HGB BLD-MCNC: 14.7 G/DL
IMM GRANULOCYTES NFR BLD AUTO: 0.5 %
LYMPHOCYTES # BLD AUTO: 2.38 K/UL
LYMPHOCYTES NFR BLD AUTO: 42.7 %
MAN DIFF?: NORMAL
MCHC RBC-ENTMCNC: 25 PG
MCHC RBC-ENTMCNC: 31.7 GM/DL
MCV RBC AUTO: 78.6 FL
MONOCYTES # BLD AUTO: 0.44 K/UL
MONOCYTES NFR BLD AUTO: 7.9 %
NEUTROPHILS # BLD AUTO: 2.51 K/UL
NEUTROPHILS NFR BLD AUTO: 45.1 %
PLATELET # BLD AUTO: 342 K/UL
RBC # BLD: 5.89 M/UL
RBC # FLD: 14.2 %
WBC # FLD AUTO: 5.57 K/UL

## 2022-07-07 PROCEDURE — 99214 OFFICE O/P EST MOD 30 MIN: CPT | Mod: 25

## 2022-07-07 PROCEDURE — 36415 COLL VENOUS BLD VENIPUNCTURE: CPT

## 2022-07-07 PROCEDURE — 82962 GLUCOSE BLOOD TEST: CPT

## 2022-07-07 RX ORDER — DULAGLUTIDE 0.75 MG/.5ML
0.75 INJECTION, SOLUTION SUBCUTANEOUS
Qty: 3 | Refills: 2 | Status: DISCONTINUED | COMMUNITY
Start: 2022-03-15 | End: 2022-07-07

## 2022-07-07 NOTE — HISTORY OF PRESENT ILLNESS
[FreeTextEntry1] : 47 year male f/u for multiple medical issues\par \par \par *** Jul 07, 2022 ***\par \par taking Trulicity 0.75 mg, still on Metformin  mg 1 tab daily, MMI 5 mg 1/2 tab qd\par not using Jalen ("irritates the skin"). still gaining weight\par reports fbs and ppg-  in 90's- low 100's\par \par *** Mar 15, 2022 ***\par \par missed f/u visits\par stopped Rybelsus b/o GI distress\par taking metformin  500 mg bid only (diarrhea on 3 pills), MMI 5 mg BID (? why the dose was increased)\par stopped using Jalen 2 few weeks ago\par prior to that reports fbs- 90's\par \par *** Aug 16, 2021 ***\par \par missed f/u appts\par diet is much worse past several months- very high in carbs (soda, donuts, fast food)\par FS in 300's, polyuria- went to er last week, a1c- 10.8. no tft's checked\par saw PCP last week- started on metformin 500 mg, taking once a day only\par taking MMI 5 mg qod\par \par *** Televisit  Dec 17, 2020 ***\par \par stopped MMI again about 3 weeks ago b/o "legs were hurting"\par feels fine since stopping the medication\par no recent labs\par \par \par *** Nov 09, 2020 ***\par \par on MMI 10 mg qd, feels well on this dose. myalgia resolved on a small dose of MMI\par feels that certain food "triggers his thyroid issues". feels fine overall. weight has stabilized\par states that his typical weight before developing thyroid issues was 250-260 lbs\par \par *** Sep 17, 2020 ***\par \par missed f/u appt\par off MMI and inderal since late may. did not repeat labs yet\par feels much better overall. gained some weight\par \par *** Televisit  May 28, 2020 ***\par \par feels much better since stopping MMI\par no more legs pain. gained about 10 lbs\par \par *** Televisit  Apr 07, 2020 ***\par \par on MMI 10 gm qd, inderal 20 mg tid\par feels better overall. not gaining weight anymore\par \par *** Feb 11, 2020 ***\par \par feels well. gained weight\par still on mmi 20 mg bid ("forgot to lower the dose"), inderal 20mg tid\par \par *** Dec 09, 2019 ***\par \par on mmi 20mg bid, inderal 20mg tid\par feels much better overall\par no rpt labs yet\par \par \par HPI:\par He developed "throat irritation" in August, was seen by ENT and treated with Augmentin for a presumed infection. \par Patient denies prior history of thyroid disorders.\par Denies recent URI, exposure to iodine-containing medications, supplements/ IV contrast, PO or IV steroids. \par Denies family h/o thyroid cancer or history of radiation exposure to head and neck area in a childhood.\par Patient complains of 40 lbs weight loss within past 3 months, fatigue, palpitations\par he denies diarrhea, skin/nail changes, hair loss, periorbital edema, anxiety, problems sleeping.\par \par TSH < 0.01,  FT4- 4.5, \par a1c- 6.8\par + TPO ab (1874), neg Tg ab\par + TBII - 4.94\par + TSI- 11.70\par \par Thyroid US (10/1/19)- enlarged heterogeneous thyroid. No nodules\par CT neck (9/17/19)- prominent nasopharyngeal soft tissue, likely residual adenoids

## 2022-07-07 NOTE — ASSESSMENT
[Diabetes Foot Care] : diabetes foot care [Long Term Vascular Complications] : long term vascular complications of diabetes [Carbohydrate Consistent Diet] : carbohydrate consistent diet [Importance of Diet and Exercise] : importance of diet and exercise to improve glycemic control, achieve weight loss and improve cardiovascular health [Exercise/Effect on Glucose] : exercise/effect on glucose [Hypoglycemia Management] : hypoglycemia management [Glucagon Use] : glucagon use [Self Monitoring of Blood Glucose] : self monitoring of blood glucose [Retinopathy Screening] : Patient was referred to ophthalmology for retinopathy screening [FreeTextEntry1] : 1. Graves disease. \par - adherence is reiterated \par - tft's today\par - we discussed proceeding with ANGUIANO and patient is considering it\par - Risk and benefits of ANGUIANO tx were extensively discussed (including, but not limited to permanent hypothyroidism post-thyroidectomy or I-131 therapy)\par - cont   MMI  2.5 mg qd\par \par 2. T2DM\par - declined resuming personal cgms\par - increase metformin er 500 mg 2 tabs qd\par - d/c Trulicity \par - trial Mounjaro (R+B)\par - monitor lipids, urine m/alb\par RTC 3 mos

## 2022-07-13 LAB
25(OH)D3 SERPL-MCNC: 18.4 NG/ML
ALBUMIN SERPL ELPH-MCNC: 4.6 G/DL
ALP BLD-CCNC: 81 U/L
ALT SERPL-CCNC: 13 U/L
ANION GAP SERPL CALC-SCNC: 14 MMOL/L
AST SERPL-CCNC: 15 U/L
BILIRUB SERPL-MCNC: 0.5 MG/DL
BUN SERPL-MCNC: 16 MG/DL
CALCIUM SERPL-MCNC: 9.7 MG/DL
CHLORIDE SERPL-SCNC: 104 MMOL/L
CHOLEST SERPL-MCNC: 177 MG/DL
CO2 SERPL-SCNC: 21 MMOL/L
CREAT SERPL-MCNC: 1.19 MG/DL
CREAT SPEC-SCNC: 178 MG/DL
EGFR: 77 ML/MIN/1.73M2
ESTIMATED AVERAGE GLUCOSE: 192 MG/DL
FOLATE SERPL-MCNC: 11.7 NG/ML
FRUCTOSAMINE SERPL-MCNC: 328 UMOL/L
GLUCOSE SERPL-MCNC: 122 MG/DL
HBA1C MFR BLD HPLC: 8.3 %
HDLC SERPL-MCNC: 31 MG/DL
LDLC SERPL CALC-MCNC: 122 MG/DL
MICROALBUMIN 24H UR DL<=1MG/L-MCNC: 5.1 MG/DL
MICROALBUMIN/CREAT 24H UR-RTO: 28 MG/G
NONHDLC SERPL-MCNC: 146 MG/DL
POTASSIUM SERPL-SCNC: 4.4 MMOL/L
PROT SERPL-MCNC: 7.4 G/DL
SODIUM SERPL-SCNC: 139 MMOL/L
T3 SERPL-MCNC: 124 NG/DL
T4 FREE SERPL-MCNC: 1.2 NG/DL
TRIGL SERPL-MCNC: 120 MG/DL
TSH RECEPTOR AB: <1.1 IU/L
TSH SERPL-ACNC: 1.24 UIU/ML
TSI ACT/NOR SER: 0.12 IU/L
VIT B12 SERPL-MCNC: 578 PG/ML

## 2022-08-18 RX ORDER — METFORMIN ER 500 MG 500 MG/1
500 TABLET ORAL DAILY
Qty: 270 | Refills: 2 | Status: DISCONTINUED | COMMUNITY
Start: 2022-03-15 | End: 2022-08-18

## 2022-09-20 ENCOUNTER — APPOINTMENT (OUTPATIENT)
Dept: ENDOCRINOLOGY | Facility: CLINIC | Age: 47
End: 2022-09-20

## 2023-04-21 ENCOUNTER — NON-APPOINTMENT (OUTPATIENT)
Age: 48
End: 2023-04-21

## 2023-05-16 ENCOUNTER — APPOINTMENT (OUTPATIENT)
Dept: ENDOCRINOLOGY | Facility: CLINIC | Age: 48
End: 2023-05-16
Payer: COMMERCIAL

## 2023-05-16 VITALS
HEART RATE: 67 BPM | WEIGHT: 250 LBS | DIASTOLIC BLOOD PRESSURE: 68 MMHG | BODY MASS INDEX: 33.13 KG/M2 | HEIGHT: 73 IN | RESPIRATION RATE: 16 BRPM | OXYGEN SATURATION: 99 % | TEMPERATURE: 97.3 F | SYSTOLIC BLOOD PRESSURE: 120 MMHG

## 2023-05-16 DIAGNOSIS — E05.90 THYROTOXICOSIS, UNSPECIFIED W/OUT THYROTOXIC CRISIS OR STORM: ICD-10-CM

## 2023-05-16 LAB — GLUCOSE BLDC GLUCOMTR-MCNC: 268

## 2023-05-16 PROCEDURE — 99214 OFFICE O/P EST MOD 30 MIN: CPT | Mod: 25

## 2023-05-16 PROCEDURE — 36415 COLL VENOUS BLD VENIPUNCTURE: CPT

## 2023-05-16 RX ORDER — METFORMIN HYDROCHLORIDE 500 MG/1
500 TABLET, COATED ORAL DAILY
Qty: 270 | Refills: 2 | Status: DISCONTINUED | COMMUNITY
Start: 2022-08-18 | End: 2023-05-16

## 2023-05-16 RX ORDER — ROSUVASTATIN CALCIUM 10 MG/1
10 TABLET, FILM COATED ORAL
Qty: 90 | Refills: 2 | Status: ACTIVE | COMMUNITY
Start: 2022-07-13 | End: 1900-01-01

## 2023-05-17 LAB
25(OH)D3 SERPL-MCNC: 15.3 NG/ML
ALBUMIN SERPL ELPH-MCNC: 4.5 G/DL
ALP BLD-CCNC: 93 U/L
ALT SERPL-CCNC: 13 U/L
ANION GAP SERPL CALC-SCNC: 15 MMOL/L
AST SERPL-CCNC: 14 U/L
BASOPHILS # BLD AUTO: 0.05 K/UL
BASOPHILS NFR BLD AUTO: 0.9 %
BILIRUB SERPL-MCNC: 0.8 MG/DL
BUN SERPL-MCNC: 12 MG/DL
CALCIUM SERPL-MCNC: 10.3 MG/DL
CHLORIDE SERPL-SCNC: 101 MMOL/L
CHOLEST SERPL-MCNC: 182 MG/DL
CO2 SERPL-SCNC: 23 MMOL/L
CREAT SERPL-MCNC: 1.25 MG/DL
CREAT SPEC-SCNC: 139 MG/DL
EGFR: 72 ML/MIN/1.73M2
EOSINOPHIL # BLD AUTO: 0.14 K/UL
EOSINOPHIL NFR BLD AUTO: 2.5 %
ESTIMATED AVERAGE GLUCOSE: 280 MG/DL
FOLATE SERPL-MCNC: 12.2 NG/ML
FRUCTOSAMINE SERPL-MCNC: 530 UMOL/L
GLUCOSE SERPL-MCNC: 253 MG/DL
HBA1C MFR BLD HPLC: 11.4 %
HCT VFR BLD CALC: 46.9 %
HDLC SERPL-MCNC: 32 MG/DL
HGB BLD-MCNC: 14.8 G/DL
IMM GRANULOCYTES NFR BLD AUTO: 0.4 %
LDLC SERPL CALC-MCNC: 106 MG/DL
LYMPHOCYTES # BLD AUTO: 2.27 K/UL
LYMPHOCYTES NFR BLD AUTO: 40.4 %
MAN DIFF?: NORMAL
MCHC RBC-ENTMCNC: 25.3 PG
MCHC RBC-ENTMCNC: 31.6 GM/DL
MCV RBC AUTO: 80 FL
MICROALBUMIN 24H UR DL<=1MG/L-MCNC: 2.4 MG/DL
MICROALBUMIN/CREAT 24H UR-RTO: 17 MG/G
MONOCYTES # BLD AUTO: 0.36 K/UL
MONOCYTES NFR BLD AUTO: 6.4 %
NEUTROPHILS # BLD AUTO: 2.78 K/UL
NEUTROPHILS NFR BLD AUTO: 49.4 %
NONHDLC SERPL-MCNC: 150 MG/DL
PLATELET # BLD AUTO: 312 K/UL
POTASSIUM SERPL-SCNC: 5.3 MMOL/L
PROT SERPL-MCNC: 7.7 G/DL
RBC # BLD: 5.86 M/UL
RBC # FLD: 14.8 %
SODIUM SERPL-SCNC: 139 MMOL/L
T3 SERPL-MCNC: 120 NG/DL
T4 FREE SERPL-MCNC: 1.5 NG/DL
THYROGLOB AB SERPL-ACNC: <20 IU/ML
THYROPEROXIDASE AB SERPL IA-ACNC: 297 IU/ML
TRIGL SERPL-MCNC: 218 MG/DL
TSH SERPL-ACNC: 0.66 UIU/ML
VIT B12 SERPL-MCNC: 401 PG/ML
WBC # FLD AUTO: 5.62 K/UL

## 2023-05-17 RX ORDER — ERGOCALCIFEROL 1.25 MG/1
1.25 MG CAPSULE ORAL
Qty: 13 | Refills: 2 | Status: ACTIVE | COMMUNITY
Start: 2023-05-17 | End: 1900-01-01

## 2023-05-17 NOTE — ASSESSMENT
[Diabetes Foot Care] : diabetes foot care [Long Term Vascular Complications] : long term vascular complications of diabetes [Carbohydrate Consistent Diet] : carbohydrate consistent diet [Importance of Diet and Exercise] : importance of diet and exercise to improve glycemic control, achieve weight loss and improve cardiovascular health [Exercise/Effect on Glucose] : exercise/effect on glucose [Hypoglycemia Management] : hypoglycemia management [Glucagon Use] : glucagon use [Self Monitoring of Blood Glucose] : self monitoring of blood glucose [Retinopathy Screening] : Patient was referred to ophthalmology for retinopathy screening [FreeTextEntry1] : 1. Graves disease. \par - adherence is reiterated \par - tft's today and possibly resume MMI  2.5 mg qd\par - we discussed proceeding with ANGUIANO and patient is considering it\par - Risk and benefits of ANGUIANO tx were extensively discussed (including, but not limited to permanent hypothyroidism post-thyroidectomy or I-131 therapy)\par \par \par 2. T2DM, uncontrolled\par - Reiterated importance of continuing on meds\par - Agreed to resuming personal cgms.  [employer] today\par - resume metformin er 500 mg 4 tabs qd\par - Resume Mounjaro 2.5 mg qw and crestor 10 mg HS\par - monitor lipids, urine m/alb, Refer to Cardiology\par \par 3.  Vitamin D deficiency\par recheck levels and possibly resume ergocalciferol 50,000 units once a week\par \par RTC 2 weeks

## 2023-05-17 NOTE — HISTORY OF PRESENT ILLNESS
[FreeTextEntry1] : 47 year male f/u for multiple medical issues\par \par *** May 16, 2023 ***\par \par Missed follow-up visit\par Lost 30 lbs on mounjaro but stopped taking it about 10 months ago and gained it back, controlled BG with metformin but stopped taking it d/t BG controlled.  \par Mr Stokes reports polydipsia, polyuria diet out of control d/t high stress- career change, family issues. Took fingerstick yesterday and BG was 409mg/dL, He took 3 Metformin and BG came down to 200s, symptoms improved.  Pt is ready to resume medication, change lifestyle.  Pt reports he has not taken methimazole since approximately May 2022 denies weight loss, rapid irregular heart beat, nervousness, irritability, difficulty sleeping fatigue, muscle weakness, frequent bowel movements, but endorses chronic heat intolerance and shaky hands.  \par Pt does not check fingersticks\par Hypoglycemia frequency: Pt denies subjective lows\par Fingerstick glucose in the office today is 268 g/dL 1 hours after eating.\par Diet: not following ADA\par Exercise:Currently no.  Plans to go to gym with wife daily.\par Lab review: a1c- 8.3% July 2022 \par \par  Optho- may 15,2023\par Last dilated eye -\par Last podiatry visit- 2022\par Last cardiology evaluation -No\par Last stress test - No\par Last 2-D Echo -No\par Last nephrology evaluation -No\par \par *** Jul 07, 2022 ***\par \par taking Trulicity 0.75 mg, still on Metformin  mg 1 tab daily, MMI 5 mg 1/2 tab qd\par not using Jalen ("irritates the skin"). still gaining weight\par reports fbs and ppg-  in 90's- low 100's\par \par *** Mar 15, 2022 ***\par \par missed f/u visits\par stopped Rybelsus b/o GI distress\par taking metformin  500 mg bid only (diarrhea on 3 pills), MMI 5 mg BID (? why the dose was increased)\par stopped using Jalen 2 few weeks ago\par prior to that reports fbs- 90's\par \par *** Aug 16, 2021 ***\par \par missed f/u appts\par diet is much worse past several months- very high in carbs (soda, donuts, fast food)\par FS in 300's, polyuria- went to er last week, a1c- 10.8. no tft's checked\par saw PCP last week- started on metformin 500 mg, taking once a day only\par taking MMI 5 mg qod\par \par *** Televisit  Dec 17, 2020 ***\par \par stopped MMI again about 3 weeks ago b/o "legs were hurting"\par feels fine since stopping the medication\par no recent labs\par \par \par *** Nov 09, 2020 ***\par \par on MMI 10 mg qd, feels well on this dose. myalgia resolved on a small dose of MMI\par feels that certain food "triggers his thyroid issues". feels fine overall. weight has stabilized\par states that his typical weight before developing thyroid issues was 250-260 lbs\par \par *** Sep 17, 2020 ***\par \par missed f/u appt\par off MMI and inderal since late may. did not repeat labs yet\par feels much better overall. gained some weight\par \par *** Televisit  May 28, 2020 ***\par \par feels much better since stopping MMI\par no more legs pain. gained about 10 lbs\par \par *** Televisit  Apr 07, 2020 ***\par \par on MMI 10 gm qd, inderal 20 mg tid\par feels better overall. not gaining weight anymore\par \par *** Feb 11, 2020 ***\par \par feels well. gained weight\par still on mmi 20 mg bid ("forgot to lower the dose"), inderal 20mg tid\par \par *** Dec 09, 2019 ***\par \par on mmi 20mg bid, inderal 20mg tid\par feels much better overall\par no rpt labs yet\par \par \par HPI:\par He developed "throat irritation" in August, was seen by ENT and treated with Augmentin for a presumed infection. \par Patient denies prior history of thyroid disorders.\par Denies recent URI, exposure to iodine-containing medications, supplements/ IV contrast, PO or IV steroids. \par Denies family h/o thyroid cancer or history of radiation exposure to head and neck area in a childhood.\par Patient complains of 40 lbs weight loss within past 3 months, fatigue, palpitations\par he denies diarrhea, skin/nail changes, hair loss, periorbital edema, anxiety, problems sleeping.\par \par TSH < 0.01,  FT4- 4.5, \par a1c- 6.8\par + TPO ab (1874), neg Tg ab\par + TBII - 4.94\par + TSI- 11.70\par \par Thyroid US (10/1/19)- enlarged heterogeneous thyroid. No nodules\par CT neck (9/17/19)- prominent nasopharyngeal soft tissue, likely residual adenoids

## 2023-05-19 LAB — TSI ACT/NOR SER: <0.1 IU/L

## 2023-05-20 LAB — TSH RECEPTOR AB: <1.1 IU/L

## 2023-05-25 RX ORDER — BLOOD-GLUCOSE SENSOR
EACH MISCELLANEOUS
Qty: 6 | Refills: 2 | Status: DISCONTINUED | COMMUNITY
Start: 2023-05-16 | End: 2023-05-25

## 2023-05-25 RX ORDER — FLASH GLUCOSE SENSOR
KIT MISCELLANEOUS
Qty: 2 | Refills: 12 | Status: DISCONTINUED | COMMUNITY
Start: 2021-08-16 | End: 2023-05-25

## 2023-06-01 ENCOUNTER — APPOINTMENT (OUTPATIENT)
Dept: ENDOCRINOLOGY | Facility: CLINIC | Age: 48
End: 2023-06-01
Payer: COMMERCIAL

## 2023-06-01 VITALS
SYSTOLIC BLOOD PRESSURE: 116 MMHG | RESPIRATION RATE: 16 BRPM | BODY MASS INDEX: 33 KG/M2 | WEIGHT: 249 LBS | DIASTOLIC BLOOD PRESSURE: 79 MMHG | HEART RATE: 72 BPM | TEMPERATURE: 97.5 F | OXYGEN SATURATION: 96 % | HEIGHT: 73 IN

## 2023-06-01 LAB — GLUCOSE BLDC GLUCOMTR-MCNC: 154

## 2023-06-01 PROCEDURE — 82962 GLUCOSE BLOOD TEST: CPT

## 2023-06-01 PROCEDURE — 99213 OFFICE O/P EST LOW 20 MIN: CPT | Mod: 25

## 2023-06-01 RX ORDER — METFORMIN HYDROCHLORIDE 500 MG/1
500 TABLET, COATED ORAL
Qty: 270 | Refills: 3 | Status: ACTIVE | COMMUNITY
Start: 2023-06-01 | End: 1900-01-01

## 2023-06-01 NOTE — ASSESSMENT
[Diabetes Foot Care] : diabetes foot care [Long Term Vascular Complications] : long term vascular complications of diabetes [Carbohydrate Consistent Diet] : carbohydrate consistent diet [Importance of Diet and Exercise] : importance of diet and exercise to improve glycemic control, achieve weight loss and improve cardiovascular health [Exercise/Effect on Glucose] : exercise/effect on glucose [Hypoglycemia Management] : hypoglycemia management [Glucagon Use] : glucagon use [Self Monitoring of Blood Glucose] : self monitoring of blood glucose [Retinopathy Screening] : Patient was referred to ophthalmology for retinopathy screening [FreeTextEntry1] : 1. Graves disease. \par - adherence is reiterated \par - tft's today and possibly resume MMI  2.5 mg qd\par - we discussed proceeding with Radioactive Iodine Therapy and patient is considering it\par - Risk and benefits of ANGUIANO tx were extensively discussed (including, but not limited to permanent hypothyroidism post-thyroidectomy or I-131 therapy)\par \par \par 2. T2DM, uncontrolled\par - Reiterated importance of continuing on meds\par - Will start personal cgms. \par - Continue metformin 500 mg 3 tabs qd as pt reports better results on this regimen\par - Continue Mounjaro 2.5 mg qw, and crestor 10 mg qhs\par - monitor lipids, urine m/alb, Refer to Cardiology\par -Will likely start basal insulin injections next visit if no significant improvement in BG and medication adherence is demonstrated. 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  Vitamin D deficiency\par Pt started vitamin D supplementation\par \par RTC 2 weeks

## 2023-06-01 NOTE — HISTORY OF PRESENT ILLNESS
[FreeTextEntry1] : 47 year male f/u for evaluation of glycemic control\par \par ***JUNE 1, 2023***\par \par Mr. AYALA  arrives today for follow up evaluation of glycemic control.  He reports metformin ER doesn't work as well for him as regular Metformin so He is presently on metformin er 500 mg 3 tabs qd, vitamin D,  Mounjaro 2.5 mg qw first dose two days ago, and crestor 10 mg HS, He lost his EMERSON.  He reports strict diet control since last visit, has not started exercise yet\par Lost 1 lb since last visit.\par Hypoglycemia frequency: Pt denies subjective HYPOs\par Fingerstick glucose in the office today is 154 mg/dL fasting.\par Diet: not following ADA\par Exercise:No\par Lab review: a1c- 11.4% May 17\par \par Date of download:  06/01/2023\par \par Diabetes Medications and Dosage: as above\par \par Indication for CGMS: verify a change in the treatment regimen, identify periods of hypoglycemia/ hyperglycemia.\par \par Modal day report: pattern.\par \par Pt with HYPO  % of the time ( NONE below 54),  % in target range\par \par Hyperglycemia:  % elevation\par \par Identified issues: carbohydrate counting\par \par dates analyzed:  - 06/01/2023\par \par Optho - may 15,2023\par Last dilated eye - may 15, 2023\par Last podiatry visit- 2022\par Last cardiology evaluation -No\par Last stress test - No\par Last 2-D Echo -No\par Last nephrology evaluation -No\par \par *** May 16, 2023 ***\par \par Missed follow-up visit\par Lost 30 lbs on mounjaro but stopped taking it about 10 months ago and gained it back, controlled BG with metformin but stopped taking it d/t BG controlled.  \par Mr Ayala reports polydipsia, polyuria diet out of control d/t high stress- career change, family issues. Took fingerstick yesterday and BG was 409mg/dL, He took 3 Metformin and BG came down to 200s, symptoms improved.  Pt is ready to resume medication, change lifestyle.  Pt reports he has not taken methimazole since approximately May 2022 denies weight loss, rapid irregular heart beat, nervousness, irritability, difficulty sleeping fatigue, muscle weakness, frequent bowel movements, but endorses chronic heat intolerance and shaky hands.  \par Pt does not check fingersticks\par Hypoglycemia frequency: Pt denies subjective lows\par Fingerstick glucose in the office today is 268 g/dL 1 hours after eating.\par Diet: not following ADA\par Exercise:Currently no.  Plans to go to gym with wife daily.\par Lab review: a1c- 8.3% July 2022 \par \par \par \par *** Jul 07, 2022 ***\par \par taking Trulicity 0.75 mg, still on Metformin  mg 1 tab daily, MMI 5 mg 1/2 tab qd\par not using Emerson ("irritates the skin"). still gaining weight\par reports fbs and ppg-  in 90's- low 100's\par \par *** Mar 15, 2022 ***\par \par missed f/u visits\par stopped Rybelsus b/o GI distress\par taking metformin  500 mg bid only (diarrhea on 3 pills), MMI 5 mg BID (? why the dose was increased)\par stopped using Emerson 2 few weeks ago\par prior to that reports fbs- 90's\par \par *** Aug 16, 2021 ***\par \par missed f/u appts\par diet is much worse past several months- very high in carbs (soda, donuts, fast food)\par FS in 300's, polyuria- went to er last week, a1c- 10.8. no tft's checked\par saw PCP last week- started on metformin 500 mg, taking once a day only\par taking MMI 5 mg qod\par \par *** Televisit  Dec 17, 2020 ***\par \par stopped MMI again about 3 weeks ago b/o "legs were hurting"\par feels fine since stopping the medication\par no recent labs\par \par \par *** Nov 09, 2020 ***\par \par on MMI 10 mg qd, feels well on this dose. myalgia resolved on a small dose of MMI\par feels that certain food "triggers his thyroid issues". feels fine overall. weight has stabilized\par states that his typical weight before developing thyroid issues was 250-260 lbs\par \par *** Sep 17, 2020 ***\par \par missed f/u appt\par off MMI and inderal since late may. did not repeat labs yet\par feels much better overall. gained some weight\par \par *** Televisit  May 28, 2020 ***\par \par feels much better since stopping MMI\par no more legs pain. gained about 10 lbs\par \par *** Televisit  Apr 07, 2020 ***\par \par on MMI 10 gm qd, inderal 20 mg tid\par feels better overall. not gaining weight anymore\par \par *** Feb 11, 2020 ***\par \par feels well. gained weight\par still on mmi 20 mg bid ("forgot to lower the dose"), inderal 20mg tid\par \par *** Dec 09, 2019 ***\par \par on mmi 20mg bid, inderal 20mg tid\par feels much better overall\par no rpt labs yet\par \par \par HPI:\par He developed "throat irritation" in August, was seen by ENT and treated with Augmentin for a presumed infection. \par Patient denies prior history of thyroid disorders.\par Denies recent URI, exposure to iodine-containing medications, supplements/ IV contrast, PO or IV steroids. \par Denies family h/o thyroid cancer or history of radiation exposure to head and neck area in a childhood.\par Patient complains of 40 lbs weight loss within past 3 months, fatigue, palpitations\par he denies diarrhea, skin/nail changes, hair loss, periorbital edema, anxiety, problems sleeping.\par \par TSH < 0.01,  FT4- 4.5, \par a1c- 6.8\par + TPO ab (1874), neg Tg ab\par + TBII - 4.94\par + TSI- 11.70\par \par Thyroid US (10/1/19)- enlarged heterogeneous thyroid. No nodules\par CT neck (9/17/19)- prominent nasopharyngeal soft tissue, likely residual adenoids

## 2023-06-12 ENCOUNTER — NON-APPOINTMENT (OUTPATIENT)
Age: 48
End: 2023-06-12

## 2023-06-15 ENCOUNTER — APPOINTMENT (OUTPATIENT)
Dept: ENDOCRINOLOGY | Facility: CLINIC | Age: 48
End: 2023-06-15

## 2023-06-15 RX ORDER — BLOOD-GLUCOSE TRANSMITTER
EACH MISCELLANEOUS
Qty: 1 | Refills: 3 | Status: ACTIVE | COMMUNITY
Start: 2023-05-25

## 2023-06-22 RX ORDER — BLOOD-GLUCOSE,RECEIVER,CONT
EACH MISCELLANEOUS
Qty: 1 | Refills: 0 | Status: ACTIVE | COMMUNITY
Start: 2023-05-25

## 2023-06-22 RX ORDER — BLOOD-GLUCOSE SENSOR
EACH MISCELLANEOUS
Qty: 3 | Refills: 3 | Status: ACTIVE | COMMUNITY
Start: 2023-06-19

## 2023-06-22 RX ORDER — BLOOD-GLUCOSE SENSOR
EACH MISCELLANEOUS
Qty: 3 | Refills: 3 | Status: ACTIVE | COMMUNITY
Start: 2023-05-25

## 2023-06-26 ENCOUNTER — TRANSCRIPTION ENCOUNTER (OUTPATIENT)
Age: 48
End: 2023-06-26

## 2023-06-29 ENCOUNTER — APPOINTMENT (OUTPATIENT)
Dept: ORTHOPEDIC SURGERY | Facility: CLINIC | Age: 48
End: 2023-06-29
Payer: COMMERCIAL

## 2023-06-29 VITALS
HEIGHT: 73 IN | SYSTOLIC BLOOD PRESSURE: 123 MMHG | WEIGHT: 249 LBS | DIASTOLIC BLOOD PRESSURE: 81 MMHG | BODY MASS INDEX: 33 KG/M2

## 2023-06-29 DIAGNOSIS — M25.561 PAIN IN RIGHT KNEE: ICD-10-CM

## 2023-06-29 PROCEDURE — 20610 DRAIN/INJ JOINT/BURSA W/O US: CPT

## 2023-06-29 PROCEDURE — 73564 X-RAY EXAM KNEE 4 OR MORE: CPT | Mod: 50

## 2023-06-29 PROCEDURE — 99214 OFFICE O/P EST MOD 30 MIN: CPT | Mod: 25

## 2023-06-29 RX ORDER — BLOOD-GLUCOSE TRANSMITTER
EACH MISCELLANEOUS
Qty: 1 | Refills: 3 | Status: ACTIVE | COMMUNITY
Start: 2023-06-19

## 2023-07-03 ENCOUNTER — APPOINTMENT (OUTPATIENT)
Dept: ENDOCRINOLOGY | Facility: CLINIC | Age: 48
End: 2023-07-03

## 2023-09-06 ENCOUNTER — RESULT CHARGE (OUTPATIENT)
Age: 48
End: 2023-09-06

## 2023-09-06 ENCOUNTER — APPOINTMENT (OUTPATIENT)
Dept: ENDOCRINOLOGY | Facility: CLINIC | Age: 48
End: 2023-09-06
Payer: COMMERCIAL

## 2023-09-06 VITALS
HEIGHT: 73 IN | HEART RATE: 67 BPM | DIASTOLIC BLOOD PRESSURE: 91 MMHG | SYSTOLIC BLOOD PRESSURE: 135 MMHG | BODY MASS INDEX: 33.2 KG/M2 | WEIGHT: 250.5 LBS | OXYGEN SATURATION: 98 %

## 2023-09-06 DIAGNOSIS — E11.9 TYPE 2 DIABETES MELLITUS W/OUT COMPLICATIONS: ICD-10-CM

## 2023-09-06 DIAGNOSIS — E05.00 THYROTOXICOSIS WITH DIFFUSE GOITER W/OUT THYROTOXIC CRISIS OR STORM: ICD-10-CM

## 2023-09-06 DIAGNOSIS — E11.65 TYPE 2 DIABETES MELLITUS WITH HYPERGLYCEMIA: ICD-10-CM

## 2023-09-06 LAB — GLUCOSE BLDC GLUCOMTR-MCNC: 205

## 2023-09-06 PROCEDURE — 82962 GLUCOSE BLOOD TEST: CPT

## 2023-09-06 PROCEDURE — 99214 OFFICE O/P EST MOD 30 MIN: CPT | Mod: 25

## 2023-09-06 RX ORDER — METFORMIN ER 500 MG 500 MG/1
500 TABLET ORAL
Qty: 360 | Refills: 3 | Status: DISCONTINUED | COMMUNITY
Start: 2023-05-16 | End: 2023-09-06

## 2023-09-06 NOTE — HISTORY OF PRESENT ILLNESS
[FreeTextEntry1] : 47 year male f/u for evaluation of glycemic control  ***Sept. 6, 2023***  Mr. AYALA w/ hx of  arrives today for follow up evaluation of glycemic control, needs [employer] form.  He reports feeling well overall denies any new complaints.   He reports he never received mounjaro (on back order) or dexcom sensors, stopped taking metformin 2 months ago because his knees were hurting, He reports relief of knee pain s/p bilateral intrarticular steroid injections and discontinuation of metformin.   BG is checked 3x week at random times, reports typical 130-170 mg/dL "I've seen as low as 119 mg/dL" He reports intermittent numbness tingling to feet x 2 weeks. He denies visual changes, denies polyuria, polydipsia.   Wagner states "I want my A1c to be below 6%" He cites fear of sudden death, kidney failure and family as motivating factors to get healthy.  He reports not wanting to go through what his aunt and brother did: kidney failure, dialysis and transplants 2/2 uncontrolled diabetes     His BG in office is 205 mg/dL one hour after eating.    ***JUNE 1, 2023***  Mr. AYALA  arrives today for follow up evaluation of glycemic control.  He reports metformin ER doesn't work as well for him as regular Metformin so He is presently on metformin er 500 mg 3 tabs qd, vitamin D,  Mounjaro 2.5 mg qw first dose two days ago, and crestor 10 mg HS, He lost his EMERSON.  He reports strict diet control since last visit, has not started exercise yet Lost 1 lb since last visit. Hypoglycemia frequency: Pt denies subjective HYPOs Fingerstick glucose in the office today is 154 mg/dL fasting. Diet: not following ADA Exercise:No Lab review: a1c- 11.4% May 17  Date of download:  06/01/2023  Diabetes Medications and Dosage: as above  Indication for CGMS: verify a change in the treatment regimen, identify periods of hypoglycemia/ hyperglycemia.  Modal day report: pattern.  Pt with HYPO  % of the time ( NONE below 54),  % in target range  Hyperglycemia:  % elevation  Identified issues: carbohydrate counting  dates analyzed:  - 06/01/2023  Optho - may 15,2023 Last dilated eye - may 15, 2023 Last podiatry visit- 2022 Last cardiology evaluation -No Last stress test - No Last 2-D Echo -No Last nephrology evaluation -No  *** May 16, 2023 ***  Missed follow-up visit Lost 30 lbs on mounjaro but stopped taking it about 10 months ago and gained it back, controlled BG with metformin but stopped taking it d/t BG controlled.   Mr Divya reports polydipsia, polyuria diet out of control d/t high stress- career change, family issues. Took fingerstick yesterday and BG was 409mg/dL, He took 3 Metformin and BG came down to 200s, symptoms improved.  Pt is ready to resume medication, change lifestyle.  Pt reports he has not taken methimazole since approximately May 2022 denies weight loss, rapid irregular heart beat, nervousness, irritability, difficulty sleeping fatigue, muscle weakness, frequent bowel movements, but endorses chronic heat intolerance and shaky hands.   Pt does not check fingersticks Hypoglycemia frequency: Pt denies subjective lows Fingerstick glucose in the office today is 268 g/dL 1 hours after eating. Diet: not following ADA Exercise:Currently no.  Plans to go to gym with wife daily. Lab review: a1c- 8.3% July 2022     *** Jul 07, 2022 ***  taking Trulicity 0.75 mg, still on Metformin  mg 1 tab daily, MMI 5 mg 1/2 tab qd not using Emerson ("irritates the skin"). still gaining weight reports fbs and ppg-  in 90's- low 100's  *** Mar 15, 2022 ***  missed f/u visits stopped Rybelsus b/o GI distress taking metformin  500 mg bid only (diarrhea on 3 pills), MMI 5 mg BID (? why the dose was increased) stopped using Emerson 2 few weeks ago prior to that reports fbs- 90's  *** Aug 16, 2021 ***  missed f/u appts diet is much worse past several months- very high in carbs (soda, donuts, fast food) FS in 300's, polyuria- went to er last week, a1c- 10.8. no tft's checked saw PCP last week- started on metformin 500 mg, taking once a day only taking MMI 5 mg qod  *** Televisit  Dec 17, 2020 ***  stopped MMI again about 3 weeks ago b/o "legs were hurting" feels fine since stopping the medication no recent labs   *** Nov 09, 2020 ***  on MMI 10 mg qd, feels well on this dose. myalgia resolved on a small dose of MMI feels that certain food "triggers his thyroid issues". feels fine overall. weight has stabilized states that his typical weight before developing thyroid issues was 250-260 lbs  *** Sep 17, 2020 ***  missed f/u appt off MMI and inderal since late may. did not repeat labs yet feels much better overall. gained some weight  *** Televisit  May 28, 2020 ***  feels much better since stopping MMI no more legs pain. gained about 10 lbs  *** Televisit  Apr 07, 2020 ***  on MMI 10 gm qd, inderal 20 mg tid feels better overall. not gaining weight anymore  *** Feb 11, 2020 ***  feels well. gained weight still on mmi 20 mg bid ("forgot to lower the dose"), inderal 20mg tid  *** Dec 09, 2019 ***  on mmi 20mg bid, inderal 20mg tid feels much better overall no rpt labs yet   HPI: He developed "throat irritation" in August, was seen by ENT and treated with Augmentin for a presumed infection.  Patient denies prior history of thyroid disorders. Denies recent URI, exposure to iodine-containing medications, supplements/ IV contrast, PO or IV steroids.  Denies family h/o thyroid cancer or history of radiation exposure to head and neck area in a childhood. Patient complains of 40 lbs weight loss within past 3 months, fatigue, palpitations he denies diarrhea, skin/nail changes, hair loss, periorbital edema, anxiety, problems sleeping.  TSH < 0.01,  FT4- 4.5,  a1c- 6.8 + TPO ab (1874), neg Tg ab + TBII - 4.94 + TSI- 11.70  Thyroid US (10/1/19)- enlarged heterogeneous thyroid. No nodules CT neck (9/17/19)- prominent nasopharyngeal soft tissue, likely residual adenoids

## 2023-09-06 NOTE — PROCEDURE
[FreeTextEntry1] : General: In no acute distress.  Head: Normocephalic  Skin: Normal, no bruises. There are no cushingoid features  Eyes: No pallor, lid lag or orbitopathy  Lymph nodes: no palpable neck lymphadenopathy.  Chest: Lungs clear to auscultation  Heart: S1, S2,  Lower Extremities: No edema, no cyanosis  Foot exam: No ulcers, no onychomycotic nail changes. Good pedal pulses. 10-g [place] monofilament testing is normal bilaterally. Vibratory sensation with 128-Hz tuning fork is preserved bilaterally.

## 2023-09-06 NOTE — ASSESSMENT
[Diabetes Foot Care] : diabetes foot care [Long Term Vascular Complications] : long term vascular complications of diabetes [Carbohydrate Consistent Diet] : carbohydrate consistent diet [Importance of Diet and Exercise] : importance of diet and exercise to improve glycemic control, achieve weight loss and improve cardiovascular health [Exercise/Effect on Glucose] : exercise/effect on glucose [Hypoglycemia Management] : hypoglycemia management [Glucagon Use] : glucagon use [Self Monitoring of Blood Glucose] : self monitoring of blood glucose [Retinopathy Screening] : Patient was referred to ophthalmology for retinopathy screening [FreeTextEntry1] : 1. T2DM, uncontrolled.   I need more data at present.  Wagner's previous A1c in May was 11.4%, Since then he has not been on medications consistently. He is chronically non-adherent to medications and does not take ownership of his health care.  Medication regimens prescribed are discontinued or never picked up from pharmacy for one reason or another and he does not reach out to us for follow up.   - Blood work today -EMERSON PRO today  **Consider regimen next visit: Was on Metformin 500 mg 3 tabs qd, Mounjaro 2.5 mg qw, and crestor 10 mg qhs  1. Graves disease.  - tft's today and possibly resume MMI  2.5 mg qd  RTC 2 weeks

## 2023-09-07 PROBLEM — E11.9 DIABETES: Status: ACTIVE | Noted: 2019-10-22

## 2023-09-07 LAB
25(OH)D3 SERPL-MCNC: 22.2 NG/ML
ALBUMIN SERPL ELPH-MCNC: 4.4 G/DL
ALP BLD-CCNC: 83 U/L
ALT SERPL-CCNC: 13 U/L
ANION GAP SERPL CALC-SCNC: 14 MMOL/L
AST SERPL-CCNC: 15 U/L
BASOPHILS # BLD AUTO: 0.05 K/UL
BASOPHILS NFR BLD AUTO: 1 %
BILIRUB SERPL-MCNC: 0.6 MG/DL
BUN SERPL-MCNC: 10 MG/DL
CALCIUM SERPL-MCNC: 9.1 MG/DL
CHLORIDE SERPL-SCNC: 103 MMOL/L
CHOLEST SERPL-MCNC: 193 MG/DL
CO2 SERPL-SCNC: 22 MMOL/L
CREAT SERPL-MCNC: 1.18 MG/DL
CREAT SPEC-SCNC: 164 MG/DL
EGFR: 77 ML/MIN/1.73M2
EOSINOPHIL # BLD AUTO: 0.08 K/UL
EOSINOPHIL NFR BLD AUTO: 1.5 %
ESTIMATED AVERAGE GLUCOSE: 212 MG/DL
FOLATE SERPL-MCNC: 13.8 NG/ML
FRUCTOSAMINE SERPL-MCNC: 363 UMOL/L
GLUCOSE SERPL-MCNC: 207 MG/DL
GLYCOMARK.: 1.9 UG/ML
HBA1C MFR BLD HPLC: 9 %
HCT VFR BLD CALC: 44.8 %
HDLC SERPL-MCNC: 33 MG/DL
HGB BLD-MCNC: 14.2 G/DL
IMM GRANULOCYTES NFR BLD AUTO: 0.4 %
LDLC SERPL CALC-MCNC: 122 MG/DL
LYMPHOCYTES # BLD AUTO: 2.23 K/UL
LYMPHOCYTES NFR BLD AUTO: 42.5 %
MAN DIFF?: NORMAL
MCHC RBC-ENTMCNC: 25.3 PG
MCHC RBC-ENTMCNC: 31.7 GM/DL
MCV RBC AUTO: 79.9 FL
MICROALBUMIN 24H UR DL<=1MG/L-MCNC: 1.7 MG/DL
MICROALBUMIN/CREAT 24H UR-RTO: 10 MG/G
MONOCYTES # BLD AUTO: 0.4 K/UL
MONOCYTES NFR BLD AUTO: 7.6 %
NEUTROPHILS # BLD AUTO: 2.47 K/UL
NEUTROPHILS NFR BLD AUTO: 47 %
NONHDLC SERPL-MCNC: 161 MG/DL
PLATELET # BLD AUTO: 338 K/UL
POTASSIUM SERPL-SCNC: 4.4 MMOL/L
PROT SERPL-MCNC: 7.2 G/DL
RBC # BLD: 5.61 M/UL
RBC # FLD: 14 %
SODIUM SERPL-SCNC: 138 MMOL/L
T3 SERPL-MCNC: 130 NG/DL
T4 FREE SERPL-MCNC: 1.4 NG/DL
TRIGL SERPL-MCNC: 220 MG/DL
TSH SERPL-ACNC: 0.97 UIU/ML
VIT B12 SERPL-MCNC: 410 PG/ML
WBC # FLD AUTO: 5.25 K/UL

## 2023-09-07 RX ORDER — BLOOD-GLUCOSE SENSOR
EACH MISCELLANEOUS
Qty: 2 | Refills: 11 | Status: ACTIVE | COMMUNITY
Start: 2023-09-07 | End: 1900-01-01

## 2023-09-08 ENCOUNTER — NON-APPOINTMENT (OUTPATIENT)
Age: 48
End: 2023-09-08

## 2023-09-08 RX ORDER — TIRZEPATIDE 2.5 MG/.5ML
2.5 INJECTION, SOLUTION SUBCUTANEOUS
Qty: 3 | Refills: 1 | Status: ACTIVE | COMMUNITY
Start: 2022-07-07 | End: 1900-01-01

## 2023-09-18 ENCOUNTER — APPOINTMENT (OUTPATIENT)
Dept: ENDOCRINOLOGY | Facility: CLINIC | Age: 48
End: 2023-09-18

## 2023-10-29 ENCOUNTER — NON-APPOINTMENT (OUTPATIENT)
Age: 48
End: 2023-10-29

## 2023-11-08 ENCOUNTER — APPOINTMENT (OUTPATIENT)
Dept: ORTHOPEDIC SURGERY | Facility: CLINIC | Age: 48
End: 2023-11-08

## 2023-11-13 ENCOUNTER — APPOINTMENT (OUTPATIENT)
Dept: ORTHOPEDIC SURGERY | Facility: CLINIC | Age: 48
End: 2023-11-13
Payer: COMMERCIAL

## 2023-11-13 VITALS — HEIGHT: 73 IN | WEIGHT: 242 LBS | BODY MASS INDEX: 32.07 KG/M2

## 2023-11-13 PROCEDURE — 99214 OFFICE O/P EST MOD 30 MIN: CPT

## 2023-11-13 RX ORDER — CYCLOBENZAPRINE HYDROCHLORIDE 10 MG/1
10 TABLET, FILM COATED ORAL
Refills: 0 | Status: ACTIVE | COMMUNITY

## 2023-11-13 RX ORDER — TIZANIDINE 4 MG/1
4 TABLET ORAL 3 TIMES DAILY
Qty: 42 | Refills: 0 | Status: ACTIVE | COMMUNITY
Start: 2023-11-13 | End: 1900-01-01

## 2023-11-13 RX ORDER — NAPROXEN 500 MG/1
500 TABLET ORAL
Refills: 0 | Status: ACTIVE | COMMUNITY

## 2023-11-20 ENCOUNTER — OUTPATIENT (OUTPATIENT)
Dept: OUTPATIENT SERVICES | Facility: HOSPITAL | Age: 48
LOS: 1 days | End: 2023-11-20
Payer: COMMERCIAL

## 2023-11-20 ENCOUNTER — APPOINTMENT (OUTPATIENT)
Dept: MRI IMAGING | Facility: CLINIC | Age: 48
End: 2023-11-20
Payer: COMMERCIAL

## 2023-11-20 DIAGNOSIS — Z00.8 ENCOUNTER FOR OTHER GENERAL EXAMINATION: ICD-10-CM

## 2023-11-20 PROCEDURE — 73721 MRI JNT OF LWR EXTRE W/O DYE: CPT | Mod: 26,RT

## 2023-11-20 PROCEDURE — 73721 MRI JNT OF LWR EXTRE W/O DYE: CPT

## 2023-11-27 ENCOUNTER — APPOINTMENT (OUTPATIENT)
Dept: ORTHOPEDIC SURGERY | Facility: CLINIC | Age: 48
End: 2023-11-27
Payer: COMMERCIAL

## 2023-11-27 DIAGNOSIS — M76.899 OTHER SPECIFIED ENTHESOPATHIES OF UNSPECIFIED LOWER LIMB, EXCLUDING FOOT: ICD-10-CM

## 2023-11-27 PROCEDURE — 99213 OFFICE O/P EST LOW 20 MIN: CPT

## 2023-12-04 ENCOUNTER — APPOINTMENT (OUTPATIENT)
Dept: ORTHOPEDIC SURGERY | Facility: CLINIC | Age: 48
End: 2023-12-04
Payer: COMMERCIAL

## 2023-12-04 VITALS — WEIGHT: 240 LBS | HEIGHT: 73 IN | BODY MASS INDEX: 31.81 KG/M2

## 2023-12-04 DIAGNOSIS — M17.11 UNILATERAL PRIMARY OSTEOARTHRITIS, RIGHT KNEE: ICD-10-CM

## 2023-12-04 PROCEDURE — 99204 OFFICE O/P NEW MOD 45 MIN: CPT

## 2023-12-11 ENCOUNTER — APPOINTMENT (OUTPATIENT)
Dept: ENDOCRINOLOGY | Facility: CLINIC | Age: 48
End: 2023-12-11

## 2024-01-02 ENCOUNTER — RX RENEWAL (OUTPATIENT)
Age: 49
End: 2024-01-02

## 2024-01-04 ENCOUNTER — RESULT REVIEW (OUTPATIENT)
Age: 49
End: 2024-01-04

## 2024-01-04 ENCOUNTER — APPOINTMENT (OUTPATIENT)
Dept: ORTHOPEDIC SURGERY | Facility: CLINIC | Age: 49
End: 2024-01-04
Payer: COMMERCIAL

## 2024-01-04 ENCOUNTER — OUTPATIENT (OUTPATIENT)
Dept: OUTPATIENT SERVICES | Facility: HOSPITAL | Age: 49
LOS: 1 days | End: 2024-01-04
Payer: COMMERCIAL

## 2024-01-04 VITALS
OXYGEN SATURATION: 98 % | DIASTOLIC BLOOD PRESSURE: 68 MMHG | WEIGHT: 240 LBS | SYSTOLIC BLOOD PRESSURE: 89 MMHG | BODY MASS INDEX: 31.81 KG/M2 | HEART RATE: 66 BPM | HEIGHT: 73 IN

## 2024-01-04 DIAGNOSIS — M76.891 OTHER SPECIFIED ENTHESOPATHIES OF RIGHT LOWER LIMB, EXCLUDING FOOT: ICD-10-CM

## 2024-01-04 DIAGNOSIS — Z72.3 LACK OF PHYSICAL EXERCISE: ICD-10-CM

## 2024-01-04 PROCEDURE — 73564 X-RAY EXAM KNEE 4 OR MORE: CPT | Mod: 50

## 2024-01-04 PROCEDURE — 73564 X-RAY EXAM KNEE 4 OR MORE: CPT | Mod: 26,50

## 2024-01-04 PROCEDURE — 99214 OFFICE O/P EST MOD 30 MIN: CPT

## 2024-01-04 PROCEDURE — 73564 X-RAY EXAM KNEE 4 OR MORE: CPT

## 2024-01-16 ENCOUNTER — APPOINTMENT (OUTPATIENT)
Dept: ORTHOPEDIC SURGERY | Facility: CLINIC | Age: 49
End: 2024-01-16

## 2024-01-24 ENCOUNTER — APPOINTMENT (OUTPATIENT)
Dept: ENDOCRINOLOGY | Facility: CLINIC | Age: 49
End: 2024-01-24

## 2024-02-05 ENCOUNTER — RX RENEWAL (OUTPATIENT)
Age: 49
End: 2024-02-05

## 2024-02-05 RX ORDER — DICLOFENAC SODIUM 1% 10 MG/G
1 GEL TOPICAL
Qty: 100 | Refills: 0 | Status: ACTIVE | COMMUNITY
Start: 2024-01-04 | End: 1900-01-01

## 2024-02-15 ENCOUNTER — APPOINTMENT (OUTPATIENT)
Dept: ORTHOPEDIC SURGERY | Facility: CLINIC | Age: 49
End: 2024-02-15

## 2024-02-17 NOTE — ASSESSMENT
[FreeTextEntry1] : ABDIRIZAK AYALA is a 48 year old male with left knee pain.    I discussed with the patient that their symptoms, signs, and imaging are most consistent with  **.  We reviewed the natural history of this condition and treatment options. We agreed on the following plan:  Encouraged to continue home exercises per handout. Continue physical therapy. Recommend 150 min per week of moderate intensity aerobic activity  Medication:    prescription provided. Imaging: Follow up in 6-8 weeks.

## 2024-02-17 NOTE — HISTORY OF PRESENT ILLNESS
[de-identified] : ABDIRIZAK AYALA is a 48 year old male presents to follow up with left knee pain. Last visit was 01/04/24 at which time patient was advised to start home exercises and PT. States pain has

## 2024-02-28 ENCOUNTER — APPOINTMENT (OUTPATIENT)
Dept: ORTHOPEDIC SURGERY | Facility: CLINIC | Age: 49
End: 2024-02-28
Payer: COMMERCIAL

## 2024-02-28 VITALS
HEART RATE: 72 BPM | DIASTOLIC BLOOD PRESSURE: 85 MMHG | BODY MASS INDEX: 31.81 KG/M2 | WEIGHT: 240 LBS | OXYGEN SATURATION: 97 % | SYSTOLIC BLOOD PRESSURE: 128 MMHG | HEIGHT: 73 IN

## 2024-02-28 DIAGNOSIS — M22.41 CHONDROMALACIA PATELLAE, RIGHT KNEE: ICD-10-CM

## 2024-02-28 PROCEDURE — 20611 DRAIN/INJ JOINT/BURSA W/US: CPT | Mod: RT

## 2024-02-28 PROCEDURE — 99213 OFFICE O/P EST LOW 20 MIN: CPT | Mod: 25

## 2024-02-28 NOTE — PHYSICAL EXAM
[de-identified] : General: Well-nourished, well-developed, alert, and in no acute distress. Head: Normocephalic. Eyes: Pupils equal, extraocular muscles intact, normal sclera. Nose: No nasal discharge. Cardiovascular: Extremities are warm and well perfused. Distal pulses are symmetric bilaterally. Respiratory: No labored breathing. Extremities: Sensation is intact distally bilaterally. Distal pulses are symmetric bilaterally Lymphatic: No regional lymphadenopathy, no lymphedema Neurologic: No focal deficits Skin: Normal skin color, texture, and turgor Psychiatric: Normal affect   MSK: Examination of [right] knee:   Gait [non-antalgic] Genu varum alignment [Pain] with double leg squat Valgus moment with single leg squat No effusion No erythema, hematoma or skin lesion Tender to palpation:  medial joint line, quad tendon, patellar tendon, medial patellar facet, lateral patellar facet Nontender to palpation: lateral joint line, pes, Gerdy's tubercle, tibial tuberosity, popliteal fossa, hamstrings, ITB No warmth No Baker's cyst palpable ROM: 0-[120] [No] patellar crepitus   Log roll negative Lachman negative Anterior drawer negative Posterior drawer negative Varus/valgus stress negative at 0 and 30 deg Shaye negative Patellar grind negative   Examination of [left] knee:   No effusion, erythema, hematoma or skin lesion Nontender to palpation: medial joint line, lateral joint line, medial patellar facet, lateral patellar facet, quad tendon, patellar tendon, pes, Gerdy's tubercle, tibial tuberosity, popliteal fossa, hamstrings, ITB No warmth No Baker's cyst palpable ROM: 0-120 [No] patellar crepitus   Log roll negative Lachman negative Anterior drawer negative Posterior drawer negative Varus/valgus stress negative at 0 and 30 deg Shaye negative Patellar grind negative   Sensation is intact to light touch over the superficial and deep peroneal nerve distributions and the posterior tibial nerve distribution. Capillary refill is less than two seconds. Posterior tibial and dorsalis pedis pulses 2+ equal bilaterally. No calf swelling or tenderness bilaterally. Strength testing shows hip flexion 5/5, hip adduction 5/5, hip abduction 5/5, knee extension 5/5, knee flexion 5/5, dorsiflexion 5/5, plantar flexion 5/5, EHL 5/5 Reflexes: Patellar 2+, Achilles 2+  [de-identified] : MRI right knee (11/20/23): Increased signal within the medial meniscus body/horn does not touch the articular surface and therefore does not meet criteria for tear by MRI. Area of high-grade partial thickness cartilage loss at the posterior nonweightbearing femoral condyle. No lateral meniscal tear. Cartilage is preserved. Focal areas of full-thickness patellofemoral chondral fissuring at the central trochlear sulcus as well as at the lateral trochlear ridge. There is accompanying subchondral bone marrow edema. Similar findings are seen at the superior and lateral patellar facet.  Severe active patellar maltracking with accompanying extensor mechanism tendinosis. Small joint effusion with synovial proliferation. Small Baker's cyst with synovial proliferation.

## 2024-02-28 NOTE — CONSULT LETTER
[Dear  ___] : Dear  [unfilled], [Consult Letter:] : I had the pleasure of evaluating your patient, [unfilled]. [Please see my note below.] : Please see my note below. [Sincerely,] : Sincerely, [FreeTextEntry3] : Marsha Kauffman MD

## 2024-02-28 NOTE — PHYSICAL EXAM
[de-identified] : General: Well-nourished, well-developed, alert, and in no acute distress. Head: Normocephalic. Eyes: Pupils equal, extraocular muscles intact, normal sclera. Nose: No nasal discharge. Cardiovascular: Extremities are warm and well perfused. Distal pulses are symmetric bilaterally. Respiratory: No labored breathing. Extremities: Sensation is intact distally bilaterally. Distal pulses are symmetric bilaterally Lymphatic: No regional lymphadenopathy, no lymphedema Neurologic: No focal deficits Skin: Normal skin color, texture, and turgor Psychiatric: Normal affect   MSK: Examination of [right] knee:   Gait [non-antalgic] Genu varum alignment [Pain] with double leg squat Valgus moment with single leg squat No effusion No erythema, hematoma or skin lesion Tender to palpation:  medial joint line, quad tendon, patellar tendon, medial patellar facet, lateral patellar facet Nontender to palpation: lateral joint line, pes, Gerdy's tubercle, tibial tuberosity, popliteal fossa, hamstrings, ITB No warmth No Baker's cyst palpable ROM: 0-[120] [No] patellar crepitus   Log roll negative Lachman negative Anterior drawer negative Posterior drawer negative Varus/valgus stress negative at 0 and 30 deg Shaye negative Patellar grind negative   Examination of [left] knee:   No effusion, erythema, hematoma or skin lesion Nontender to palpation: medial joint line, lateral joint line, medial patellar facet, lateral patellar facet, quad tendon, patellar tendon, pes, Gerdy's tubercle, tibial tuberosity, popliteal fossa, hamstrings, ITB No warmth No Baker's cyst palpable ROM: 0-120 [No] patellar crepitus   Log roll negative Lachman negative Anterior drawer negative Posterior drawer negative Varus/valgus stress negative at 0 and 30 deg Shaye negative Patellar grind negative   Sensation is intact to light touch over the superficial and deep peroneal nerve distributions and the posterior tibial nerve distribution. Capillary refill is less than two seconds. Posterior tibial and dorsalis pedis pulses 2+ equal bilaterally. No calf swelling or tenderness bilaterally. Strength testing shows hip flexion 5/5, hip adduction 5/5, hip abduction 5/5, knee extension 5/5, knee flexion 5/5, dorsiflexion 5/5, plantar flexion 5/5, EHL 5/5 Reflexes: Patellar 2+, Achilles 2+  [de-identified] : MRI right knee (11/20/23): Increased signal within the medial meniscus body/horn does not touch the articular surface and therefore does not meet criteria for tear by MRI. Area of high-grade partial thickness cartilage loss at the posterior nonweightbearing femoral condyle. No lateral meniscal tear. Cartilage is preserved. Focal areas of full-thickness patellofemoral chondral fissuring at the central trochlear sulcus as well as at the lateral trochlear ridge. There is accompanying subchondral bone marrow edema. Similar findings are seen at the superior and lateral patellar facet.  Severe active patellar maltracking with accompanying extensor mechanism tendinosis. Small joint effusion with synovial proliferation. Small Baker's cyst with synovial proliferation.

## 2024-02-28 NOTE — DISCUSSION/SUMMARY

## 2024-02-28 NOTE — HISTORY OF PRESENT ILLNESS
[de-identified] : ABDIRIZAK AYALA is a 48 year old male who presents with right knee pain. 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 Denson on 11/13/23 who referred for MRI, recommended home exercises, PT and NSAIDs Had MRI detailed below. He was then prescribed a J-brace. He was offered CSI at that time but declined. Saw Dr. Vergara for second opinion who prescribed Diclofenac, advised to continue with brace and limited heavy lifting.  There is no associated swelling, stiffness, numbness, paraesthesia or weakness. Exacerbating factors are standing, walking for prolonged periods, climbing and descending stairs, rising from seated position. Has tried Naproxen and Meloxicam which did not help. Diclofenac 75mg daily works better. Patient ambulates independently. Exercise: not regular Has not tried PT Employment: Left [employer] (12/8/23) and now works at [employer] (involves a lot of standing and walking) Lives with wife Noemi Calderon (works [employer])

## 2024-02-28 NOTE — DISCUSSION/SUMMARY

## 2024-02-28 NOTE — ASSESSMENT
[FreeTextEntry1] : ABDIRIZAK AYALA is a 48 year old male with left knee pain. 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 We reviewed the natural history of this condition and treatment options ranging from conservative measures (activity modification, physical therapy, icing, oral anti-inflammatory and/or analgesic medications, steroid injection, HA gel injections, PRP injections) to surgical management. We agreed on the following plan:   XR taken today. MRI reviewed with patient today. Activity modification: low impact aerobic activity (stationary bike, elliptical, swimming) Recommend 150 min per week of moderate intensity aerobic activity Start Home Exercises for patellofemoral conditioning. Demonstration and handout provided. Physical therapy. Referral provided. Medication: Diclofenac gel prn prescription provided. Can c/w PO Diclofenac prn. Consider US guided CSI or HA gel injection if no symptomatic improvement. Discussed management plan with patient's wife over Facetime. Follow up in 6-8 weeks.

## 2024-03-01 NOTE — HISTORY OF PRESENT ILLNESS
[de-identified] : ABDIRIZAK AYALA is a 48 year old male presents to follow up right knee pain. Last visit was 1/4/24 at which time patient was advised to start home exercises and physical therapy. Had CSI right knee June 2023 which provided good relief. States pain had been improving but had an aggressive PT session 3 weeks ago which he feels exacerbated the pain. Had been attending [facility]. 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 Not taking PO analgesia and prescription ran out. Had been wearing J brace but felt it was worsening his swelling. Now wearing compression sleeve.

## 2024-03-01 NOTE — CONSULT LETTER
[Dear  ___] : Dear  [unfilled], [Please see my note below.] : Please see my note below. [Consult Letter:] : I had the pleasure of evaluating your patient, [unfilled]. [Consult Closing:] : Thank you very much for allowing me to participate in the care of this patient.  If you have any questions, please do not hesitate to contact me. [FreeTextEntry3] : Marsha Kauffman MD  [Sincerely,] : Sincerely,

## 2024-03-01 NOTE — PROCEDURE
[de-identified] : Ultrasound guided aspiration and intra-articular steroid injection of right knee:  Following a discussion of the risks (bleeding, infection) and benefits, verbal consent was obtained. Patient placed in supine position. The suprapatellar recess and surrounding structures (patella, femur, quadriceps tendon, suprapatellar fat pad and prefemoral fat pad) were visualized in SAX and LAX with Sonosite 15 Hz linear transducer.   Superolateral knee was anaesthetised with ethyl chloride spray. Under strict sterile technique the right knee was prepped with chlorhexadine. Using ultrasound guidance (superolateral approach) a 20 G 1.5 inch needle was inserted into the suprapatellar recess and after aspiration of 3 mL of clear, serous fluid, 1mL Triamcinolone, 4mL 0.5% Bupivacaine and 2mL 1% lidocaine was injected intra-articularly.    The use of direct ultrasound visualization was necessary to increase patient safety by identifying and avoiding inadvertent needle placement within the neurovascular and osteochondral structures. Additionally, the increased accuracy of needle placement may improve therapeutic efficacy and allow higher diagnostic specificity when evaluating the effectiveness of this injection.  The patient tolerated the procedure well. Post-injection instructions given (no strenuous activity for 48 hours, ice, elevate). Patient verbalized understanding.

## 2024-03-01 NOTE — PHYSICAL EXAM
[de-identified] : General: Well-nourished, well-developed, alert, and in no acute distress. Head: Normocephalic. Eyes: Pupils equal, extraocular muscles intact, normal sclera. Nose: No nasal discharge. Cardiovascular: Extremities are warm and well perfused. Distal pulses are symmetric bilaterally. Respiratory: No labored breathing. Extremities: Sensation is intact distally bilaterally. Distal pulses are symmetric bilaterally Lymphatic: No regional lymphadenopathy, no lymphedema Neurologic: No focal deficits Skin: Normal skin color, texture, and turgor Psychiatric: Normal affect   MSK: Examination of [right] knee:  Moderate effusion No erythema, hematoma or skin lesion Tender to palpation: medial joint line, quad tendon, patellar tendon, medial patellar facet, lateral patellar facet Nontender to palpation: lateral joint line, pes, Gerdy's tubercle, tibial tuberosity, popliteal fossa, hamstrings, ITB No warmth No Baker's cyst palpable ROM: 0-[100] [No] patellar crepitus  Log roll negative Lachman negative Anterior drawer negative Posterior drawer negative Varus/valgus stress negative at 0 and 30 deg Shaye negative Patellar grind negative  Examination of [left] knee:  No effusion, erythema, hematoma or skin lesion Nontender to palpation: medial joint line, lateral joint line, medial patellar facet, lateral patellar facet, quad tendon, patellar tendon, pes, Gerdy's tubercle, tibial tuberosity, popliteal fossa, hamstrings, ITB No warmth No Baker's cyst palpable ROM: 0-120 [No] patellar crepitus  Log roll negative Lachman negative Anterior drawer negative Posterior drawer negative Varus/valgus stress negative at 0 and 30 deg Shaye negative  Sensation is intact to light touch over the superficial and deep peroneal nerve distributions and the posterior tibial nerve distribution. Capillary refill is less than two seconds. Posterior tibial and dorsalis pedis pulses 2+ equal bilaterally. No calf swelling or tenderness bilaterally. Strength testing shows hip flexion 5/5, hip adduction 5/5, hip abduction 5/5, knee extension 5/5, knee flexion 5/5, dorsiflexion 5/5, plantar flexion 5/5, EHL 5/5 Reflexes: Patellar 2+, Achilles 2+

## 2024-03-01 NOTE — ASSESSMENT
[FreeTextEntry1] : ABDIRIZAK AYALA is a 48 year old male with right knee pain. I discussed with the patient that their symptoms, signs, and imaging are most consistent with osteoarthritis. We reviewed the natural history of this condition and treatment options. We agreed on the following plan:  Encouraged to continue home exercises per handout. Restart physical therapy at different location. New referral provided. Recommend 150 min per week of moderate intensity aerobic activity  Medication: Diclofenac 75mg daily prn prescription provided. Consider HA gel injection if no symptomatic improvement. c/w knee compression sleeve. Follow up in 6-8 weeks.

## 2024-03-03 ENCOUNTER — NON-APPOINTMENT (OUTPATIENT)
Age: 49
End: 2024-03-03

## 2024-03-07 ENCOUNTER — NON-APPOINTMENT (OUTPATIENT)
Age: 49
End: 2024-03-07

## 2024-03-17 ENCOUNTER — NON-APPOINTMENT (OUTPATIENT)
Age: 49
End: 2024-03-17

## 2024-03-27 ENCOUNTER — NON-APPOINTMENT (OUTPATIENT)
Age: 49
End: 2024-03-27

## 2024-04-15 ENCOUNTER — RESULT REVIEW (OUTPATIENT)
Age: 49
End: 2024-04-15

## 2024-04-15 ENCOUNTER — OUTPATIENT (OUTPATIENT)
Dept: OUTPATIENT SERVICES | Facility: HOSPITAL | Age: 49
LOS: 1 days | End: 2024-04-15
Payer: COMMERCIAL

## 2024-04-15 ENCOUNTER — APPOINTMENT (OUTPATIENT)
Dept: ORTHOPEDIC SURGERY | Facility: CLINIC | Age: 49
End: 2024-04-15
Payer: COMMERCIAL

## 2024-04-15 VITALS
SYSTOLIC BLOOD PRESSURE: 146 MMHG | WEIGHT: 233 LBS | DIASTOLIC BLOOD PRESSURE: 95 MMHG | BODY MASS INDEX: 30.88 KG/M2 | HEIGHT: 73 IN | HEART RATE: 77 BPM | OXYGEN SATURATION: 99 %

## 2024-04-15 DIAGNOSIS — M65.9 SYNOVITIS AND TENOSYNOVITIS, UNSPECIFIED: ICD-10-CM

## 2024-04-15 PROCEDURE — 73130 X-RAY EXAM OF HAND: CPT | Mod: LT

## 2024-04-15 PROCEDURE — 99214 OFFICE O/P EST MOD 30 MIN: CPT

## 2024-04-15 PROCEDURE — 73130 X-RAY EXAM OF HAND: CPT | Mod: 26,LT

## 2024-04-15 PROCEDURE — 73130 X-RAY EXAM OF HAND: CPT

## 2024-04-15 PROCEDURE — 73110 X-RAY EXAM OF WRIST: CPT | Mod: LT

## 2024-04-15 PROCEDURE — 73110 X-RAY EXAM OF WRIST: CPT | Mod: 26,LT

## 2024-04-15 PROCEDURE — 73110 X-RAY EXAM OF WRIST: CPT

## 2024-04-15 RX ORDER — HYALURONATE SODIUM, STABILIZED 60 MG/3 ML
60 SYRINGE (ML) INTRAARTICULAR
Qty: 1 | Refills: 0 | Status: ACTIVE | COMMUNITY
Start: 2024-04-15

## 2024-04-15 NOTE — DISCUSSION/SUMMARY

## 2024-04-15 NOTE — CONSULT LETTER
[Dear  ___] : Dear  [unfilled], [Consult Letter:] : I had the pleasure of evaluating your patient, [unfilled]. [Please see my note below.] : Please see my note below. [Consult Closing:] : Thank you very much for allowing me to participate in the care of this patient.  If you have any questions, please do not hesitate to contact me. [Sincerely,] : Sincerely, [FreeTextEntry3] : Marsha Kauffman MD

## 2024-04-15 NOTE — ASSESSMENT
[FreeTextEntry1] : Wagner Stokes is a 48 year old male with left wrist pain.  I discussed with the patient that their symptoms, signs, and imaging are most consistent with tenosynovitis. We reviewed the natural history of this condition and treatment options. We agreed on the following plan:   X-ray taken and reviewed with patient today.  Activity modification: Occupational therapy referral provided.  Recommend 150 min per week of moderate intensity aerobic activity Medication: continue with diclofenac 35 mg as needed. Discussed ergonomic workspace design and safe carrying techniques. Continue with thumb spica brace. Imaging: consider MRI with no symptomatic improvement. I will order HA gel injection for right knee. Patient will be contacted when authorized by the insurance. Follow up in 6 weeks.

## 2024-04-15 NOTE — HISTORY OF PRESENT ILLNESS
[de-identified] : ABDIRIZAK AYALA is a 48 year old male presents to follow up right knee pain. Last visit was 02/28/24 for right knee pain at which time patient had CSI. States that he had good relief from CSI. Presents today with new onset of left hand and wrist pain. On 3/1/2024, states that he was helping a [employer]  move a package off the truck and the package fell hitting pt on the head and shoulder. In the LT wrist he also has been having pain in the 1st and 3rd digit from trying to catch the [employer] box from falling. Was seen at Urgent care 2 weeks ago and was provided long rigid wrist splint. Found it uncomfortable so changed to soft thumb spica brace. Has been using PO Diclofenac for pain.  Pt is RHD.  Patient also describes that despite having good relief from the CSI for the right knee, he feels that the pain is returning, particularly experiencing discomfort at night.

## 2024-04-15 NOTE — ADDENDUM
[FreeTextEntry1] :  Documented by Regi Truong acting as a scribe under Dr. Marsha Kauffman. 04/15/2024

## 2024-04-15 NOTE — PHYSICAL EXAM
[de-identified] : General: Well-nourished, well-developed, alert, and in no acute distress. Head: Normocephalic. Eyes: Pupils equal, extraocular muscles intact, normal sclera. Nose: No nasal discharge. Cardiovascular: Extremities are warm and well perfused. Distal pulses are symmetric bilaterally. Respiratory: No labored breathing. Extremities: Sensation is intact distally bilaterally. Distal pulses are symmetric bilaterally Lymphatic: No regional lymphadenopathy, no lymphedema Neurologic: No focal deficits Skin: Normal skin color, texture, and turgor Psychiatric: Normal affect  MSK: Examination of [left] hand and wrist: Inspection: no erythema, ecchymosis, deformity, atrophy, scars, skin or nail changes. Tender to palpation:  CMC joint, dorsal wrist compartments, TFCC Nontender to palpation: scaphoid, scapholunate ligament, lunotriquetral ligament,  pisotriquetral joint, hook of hamate, flexor retinaculum, radial ulnar joint ROM: wrist flexion [90] deg, wrist extension [70] deg, ulnar deviation [50] deg, radial deviation [20] deg, MCP flex [90] deg, pronation [90] deg, supination [90] deg, finger abd, finger add   Special tests: 1st CMC grind [negative] Finkelstein's [negative] TFCC compression positive  Wen [negative] Tinel [negative] Durkan [negative] Phalen [negative]   Thumbs up (radial), fist (median), peace (ulnar), A-OK (AIN inn [employer]) intact   Examination of [right] hand and wrist: Inspection: no erythema, ecchymosis, deformity, atrophy, scars, skin or nail changes. Tender to palpation: Nontender to palpation: scaphoid, scapholunate ligament, lunotriquetral ligament,  pisotriquetral joint, hook of hamate, CMC joint, dorsal wrist compartments, flexor retinaculum, radial ulnar joint or TFCC ROM: full wrist flexion 90 deg, wrist extension 70 deg, ulnar deviation 50 deg, radial deviation 20 deg, MCP flex 90 deg, pronation 90 deg, supination 90 deg, finger abd, finger add     Sensation is intact to light touch over the axillary, musculocutaneous, median, radial, and ulnar nerve distributions bilaterally. Capillary refill is less than two seconds. Radial pulses 2+ equal bilaterally. Strength testing shows 5/5 abduction, 5/5 external rotation, 5/5 internal rotation, 5/5 biceps, 5/5 triceps. 5/5 wrist extension, 5/5 intrinsics. Reflexes 2+ biceps, brachioradialis. Rae negative.  [de-identified] : X-ray Left hand and wrist, 4/15/24: There is no evidence of acute fracture or dislocation. Joint spaces are preserved. Negative ulnar variance.

## 2024-04-15 NOTE — END OF VISIT
[FreeTextEntry3] :  Documented by Regi Truong acting as a scribe for Dr. Marsha Kauffman. 04/15/2024   All medical record entries made by the Scribe were at my, Dr. Marsha Kauffman. direction and personally dictated by me on 04/15/2024. I have reviewed the chart and agree that the record accurately reflects my personal performance of the history, physical exam, assessment and plan. I have also personally directed, reviewed, and agreed with the chart. [Time Spent: ___ minutes] : I have spent [unfilled] minutes of time on the encounter.

## 2024-04-27 ENCOUNTER — TRANSCRIPTION ENCOUNTER (OUTPATIENT)
Age: 49
End: 2024-04-27

## 2024-05-07 RX ORDER — HYALURONATE SODIUM 20 MG/2 ML
20 SYRINGE (ML) INTRAARTICULAR
Qty: 1 | Refills: 0 | Status: ACTIVE | COMMUNITY
Start: 2024-05-07 | End: 1900-01-01

## 2024-05-08 RX ORDER — MELOXICAM 15 MG/1
15 TABLET ORAL
Qty: 30 | Refills: 1 | Status: DISCONTINUED | COMMUNITY
Start: 2023-06-29 | End: 2024-05-08

## 2024-05-08 RX ORDER — DICLOFENAC SODIUM 75 MG/1
75 TABLET, DELAYED RELEASE ORAL TWICE DAILY
Qty: 60 | Refills: 0 | Status: DISCONTINUED | COMMUNITY
Start: 2023-12-04 | End: 2024-05-08

## 2024-05-08 RX ORDER — DICLOFENAC SODIUM 75 MG/1
75 TABLET, DELAYED RELEASE ORAL
Qty: 30 | Refills: 1 | Status: ACTIVE | COMMUNITY
Start: 2024-02-28 | End: 1900-01-01

## 2024-05-09 ENCOUNTER — RX RENEWAL (OUTPATIENT)
Age: 49
End: 2024-05-09

## 2024-05-10 ENCOUNTER — NON-APPOINTMENT (OUTPATIENT)
Age: 49
End: 2024-05-10

## 2024-05-25 ENCOUNTER — TRANSCRIPTION ENCOUNTER (OUTPATIENT)
Age: 49
End: 2024-05-25

## 2024-05-26 ENCOUNTER — TRANSCRIPTION ENCOUNTER (OUTPATIENT)
Age: 49
End: 2024-05-26

## 2024-05-26 ENCOUNTER — EMERGENCY (EMERGENCY)
Facility: HOSPITAL | Age: 49
LOS: 1 days | Discharge: HOME | End: 2024-05-26
Attending: EMERGENCY MEDICINE | Admitting: EMERGENCY MEDICINE
Payer: COMMERCIAL

## 2024-05-26 VITALS
HEART RATE: 88 BPM | DIASTOLIC BLOOD PRESSURE: 84 MMHG | SYSTOLIC BLOOD PRESSURE: 134 MMHG | OXYGEN SATURATION: 100 % | TEMPERATURE: 98 F | RESPIRATION RATE: 18 BRPM

## 2024-05-26 PROCEDURE — 93971 EXTREMITY STUDY: CPT | Mod: 26,LT

## 2024-05-26 PROCEDURE — 99284 EMERGENCY DEPT VISIT MOD MDM: CPT

## 2024-05-26 PROCEDURE — 73562 X-RAY EXAM OF KNEE 3: CPT | Mod: 26,RT

## 2024-05-26 RX ORDER — MORPHINE SULFATE 50 MG/1
1 CAPSULE, EXTENDED RELEASE ORAL
Qty: 12 | Refills: 0
Start: 2024-05-26 | End: 2024-05-28

## 2024-05-26 RX ORDER — LIDOCAINE HCL 20 MG/ML
2 VIAL (ML) INJECTION ONCE
Refills: 0 | Status: DISCONTINUED | OUTPATIENT
Start: 2024-05-26 | End: 2024-05-30

## 2024-05-26 RX ORDER — TRIAMCINOLONE 4 MG
80 TABLET ORAL ONCE
Refills: 0 | Status: DISCONTINUED | OUTPATIENT
Start: 2024-05-26 | End: 2024-05-30

## 2024-05-26 RX ORDER — BUPIVACAINE HCL/PF 7.5 MG/ML
3 VIAL (ML) INJECTION ONCE
Refills: 0 | Status: DISCONTINUED | OUTPATIENT
Start: 2024-05-26 | End: 2024-05-30

## 2024-05-26 RX ORDER — BUPIVACAINE HCL/EPINEPHRINE/PF 0.5-1:200K
3 VIAL (ML) INJECTION ONCE
Refills: 0 | Status: DISCONTINUED | OUTPATIENT
Start: 2024-05-26 | End: 2024-05-26
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
## 2024-05-26 NOTE — ED PROVIDER NOTE - NSFOLLOWUPINSTRUCTIONS_ED_ALL_ED_FT
Follow up with ortho this week, as scheduled.     Rest, ice and elevate knee.    Continue Diclofenac as prescribed.  Take MS Contin 15mg every 6 hours as needed for pain- caution drowsiness while taking this medication- do not drive or operate heavy machinery.    Worsening pain, swelling, weakness, numbness, fever return to ER
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
## 2024-05-26 NOTE — ED PROVIDER NOTE - PATIENT PORTAL LINK FT
You can access the FollowMyHealth Patient Portal offered by [facility] by registering at the following website: [email or web address]. By joining [email or web address]’s FollowMyHealth portal, you will also be able to view your health information using other applications (apps) compatible with our system.

## 2024-05-29 ENCOUNTER — APPOINTMENT (OUTPATIENT)
Dept: ORTHOPEDIC SURGERY | Facility: CLINIC | Age: 49
End: 2024-05-29
Payer: COMMERCIAL

## 2024-05-29 ENCOUNTER — TRANSCRIPTION ENCOUNTER (OUTPATIENT)
Age: 49
End: 2024-05-29

## 2024-05-29 VITALS
BODY MASS INDEX: 30.88 KG/M2 | HEART RATE: 66 BPM | OXYGEN SATURATION: 97 % | WEIGHT: 233 LBS | SYSTOLIC BLOOD PRESSURE: 133 MMHG | DIASTOLIC BLOOD PRESSURE: 85 MMHG | HEIGHT: 73 IN

## 2024-05-29 PROCEDURE — 99214 OFFICE O/P EST MOD 30 MIN: CPT

## 2024-05-29 RX ORDER — ETODOLAC 400 MG/1
400 TABLET, FILM COATED ORAL TWICE DAILY
Qty: 30 | Refills: 1 | Status: ACTIVE | COMMUNITY
Start: 2024-05-29 | End: 1900-01-01

## 2024-05-31 NOTE — DISCUSSION/SUMMARY

## 2024-05-31 NOTE — PHYSICAL EXAM
[de-identified] : General: Well-nourished, well-developed, alert, and in no acute distress. Head: Normocephalic. Eyes: Pupils equal, extraocular muscles intact, normal sclera. Nose: No nasal discharge. Cardiovascular: Extremities are warm and well perfused. Distal pulses are symmetric bilaterally. Respiratory: No labored breathing. Extremities: Sensation is intact distally bilaterally. Distal pulses are symmetric bilaterally Lymphatic: No regional lymphadenopathy, no lymphedema Neurologic: No focal deficits Skin: Normal skin color, texture, and turgor Psychiatric: Normal affect   MSK: Gait is normal Examination of [right] knee:   [No] effusion No erythema, hematoma or skin lesion Tender to palpation: medial and lateral joint line Nontender to palpation: medial patellar facet, lateral patellar facet, quad tendon, patellar tendon, pes, Gerdy's tubercle, tibial tuberosity, popliteal fossa, hamstrings, ITB No warmth No Baker's cyst palpable ROM: 0-110 [Mild] patellar crepitus   Log roll negative Lachman negative Anterior drawer negative Posterior drawer negative Varus/valgus stress negative at 0 and 30 deg Shaye negative Patellar grind negative    Sensation is intact to light touch over the superficial and deep peroneal nerve distributions and the posterior tibial nerve distribution. Capillary refill is less than two seconds. Posterior tibial and dorsalis pedis pulses 2+ equal bilaterally. No calf swelling or tenderness bilaterally. Strength testing shows  Hip flexion 5/5, Hip adduction 5/5, Hip abduction 5/5, Knee Extension 5/5, Knee Flexion 5/5, dorsiflexion 5/5, plantar flexion 5/5, EHL 5/5 Reflexes: Patellar 2+, Achilles 2+

## 2024-05-31 NOTE — ASSESSMENT
[FreeTextEntry1] : ABDIRIZAK AYALA is a 48 year old male with right knee pain. I discussed with the patient that their symptoms, signs, and imaging are most consistent with osteoarthritis.  We reviewed the natural history of this condition and treatment options. We agreed on the following plan:  X-ray reviewed with patient today. Encouraged to continue home exercises per handout. Continue physical therapy. Recommend 150 min per week of moderate intensity aerobic activity  Medication: Advised to wean from morphine. Will prescribe etodolac 400 mg BID PRN. HA gel ordered and awaiting insurance authorization. Pt will be contacted to schedule appointment once authorized.

## 2024-05-31 NOTE — END OF VISIT
[FreeTextEntry3] :   All medical record entries made by the Scribe were at my, Dr. Marsha Kauffman's, direction and personally dictated by me on 05/29/2024. I have reviewed the chart and agree that the record accurately reflects my personal performance of the history, physical exam, assessment and plan. I have also personally directed, reviewed, and agreed with the chart. [Time Spent: ___ minutes] : I have spent [unfilled] minutes of time on the encounter.
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
## 2024-06-07 ENCOUNTER — NON-APPOINTMENT (OUTPATIENT)
Age: 49
End: 2024-06-07

## 2024-06-18 ENCOUNTER — APPOINTMENT (OUTPATIENT)
Dept: ORTHOPEDIC SURGERY | Facility: CLINIC | Age: 49
End: 2024-06-18
Payer: COMMERCIAL

## 2024-06-18 VITALS
BODY MASS INDEX: 30.88 KG/M2 | SYSTOLIC BLOOD PRESSURE: 155 MMHG | WEIGHT: 233 LBS | HEART RATE: 68 BPM | HEIGHT: 73 IN | OXYGEN SATURATION: 97 % | DIASTOLIC BLOOD PRESSURE: 101 MMHG

## 2024-06-18 PROCEDURE — 20611 DRAIN/INJ JOINT/BURSA W/US: CPT | Mod: RT

## 2024-06-21 NOTE — PROCEDURE
[de-identified] : ABDIRIZAK AYALA is a 48 year old male with right knee OA who presents for viscosupplementation injection of right knee. No recent infection, fever or skin lesions.  Ultrasound-guided intra-articular viscosupplementation injection of right knee:  Following a discussion of the risks (bleeding, infection) and benefits, verbal consent was obtained. Patient placed in supine position. The suprapatellar recess and surrounding structures (patella, femur, quadriceps tendon, suprapatellar fat pad and prefemoral fat pad) were visualized in SAX and LAX with Sonosite 15 Hz linear transducer.   Superolateral knee was anaesthetised with ethyl chloride spray. Under strict sterile technique the right knee was prepped with chlorhexadine. Using ultrasound guidance (superolateral approach) a 25 G 1.5 inch needle was inserted at the superolateral to the patella and after negative aspiration, a mixture of 1mL of 1% lidocaine and 2 mL of 0.5% Bupivacaine was injected subcutaneously.  After adequate anaesthesia, a 20 G 1.5 inch needle was inserted into the suprapatellar recess and after negative aspiration,  *mL of *** HA gel was injected intra-articularly without resistance.a 20 G 1.5 inch needle was inserted into the suprapatellar recess and *mL of *** HA gel was injected intra-articularly without resistance.   The use of direct ultrasound visualization was necessary to increase patient safety by identifying and avoiding inadvertent needle placement within the neurovascular and osteochondral structures. Additionally, the increased accuracy of needle placement may improve therapeutic efficacy and allow higher diagnostic specificity when evaluating the effectiveness of this injection.   The patient tolerated the procedures well. Post-injection instructions given (no strenuous activity for 48 hours, ice, elevate). Patient verbalized understanding. Referral for physical therapy provided today.  Follow up 1 week for #2/3 injection. Letter provided.  EUFLEXXA RIGHT KNEE JOINT #1  LOT:   [id number] EXP:  2025-05-25 MAN: [employer] NDC: [id number]

## 2024-06-25 ENCOUNTER — APPOINTMENT (OUTPATIENT)
Dept: ORTHOPEDIC SURGERY | Facility: CLINIC | Age: 49
End: 2024-06-25
Payer: COMMERCIAL

## 2024-06-25 VITALS
HEART RATE: 62 BPM | BODY MASS INDEX: 29.55 KG/M2 | DIASTOLIC BLOOD PRESSURE: 92 MMHG | HEIGHT: 73 IN | SYSTOLIC BLOOD PRESSURE: 145 MMHG | WEIGHT: 223 LBS | OXYGEN SATURATION: 99 %

## 2024-06-25 DIAGNOSIS — M17.11 UNILATERAL PRIMARY OSTEOARTHRITIS, RIGHT KNEE: ICD-10-CM

## 2024-06-25 DIAGNOSIS — S83.241S OTHER TEAR OF MEDIAL MENISCUS, CURRENT INJURY, RIGHT KNEE, SEQUELA: ICD-10-CM

## 2024-06-25 PROCEDURE — 20611 DRAIN/INJ JOINT/BURSA W/US: CPT | Mod: RT

## 2024-06-26 PROBLEM — S83.241S TEAR OF MEDIAL MENISCUS OF RIGHT KNEE, CURRENT, UNSPECIFIED TEAR TYPE, SEQUELA: Status: ACTIVE | Noted: 2024-01-04

## 2024-06-30 ENCOUNTER — NON-APPOINTMENT (OUTPATIENT)
Age: 49
End: 2024-06-30

## 2024-07-09 ENCOUNTER — APPOINTMENT (OUTPATIENT)
Dept: ORTHOPEDIC SURGERY | Facility: CLINIC | Age: 49
End: 2024-07-09
Payer: COMMERCIAL

## 2024-07-09 VITALS — SYSTOLIC BLOOD PRESSURE: 137 MMHG | OXYGEN SATURATION: 98 % | HEART RATE: 77 BPM | DIASTOLIC BLOOD PRESSURE: 91 MMHG

## 2024-07-09 DIAGNOSIS — M17.11 UNILATERAL PRIMARY OSTEOARTHRITIS, RIGHT KNEE: ICD-10-CM

## 2024-07-09 PROCEDURE — 20611 DRAIN/INJ JOINT/BURSA W/US: CPT | Mod: RT

## 2024-08-01 ENCOUNTER — APPOINTMENT (OUTPATIENT)
Dept: ORTHOPEDIC SURGERY | Facility: CLINIC | Age: 49
End: 2024-08-01
Payer: COMMERCIAL

## 2024-08-01 VITALS — SYSTOLIC BLOOD PRESSURE: 134 MMHG | DIASTOLIC BLOOD PRESSURE: 86 MMHG | HEART RATE: 66 BPM | OXYGEN SATURATION: 100 %

## 2024-08-01 DIAGNOSIS — M17.11 UNILATERAL PRIMARY OSTEOARTHRITIS, RIGHT KNEE: ICD-10-CM

## 2024-08-01 DIAGNOSIS — M22.8X1 OTHER DISORDERS OF PATELLA, RIGHT KNEE: ICD-10-CM

## 2024-08-01 DIAGNOSIS — S83.241S OTHER TEAR OF MEDIAL MENISCUS, CURRENT INJURY, RIGHT KNEE, SEQUELA: ICD-10-CM

## 2024-08-01 PROCEDURE — 99214 OFFICE O/P EST MOD 30 MIN: CPT

## 2024-08-06 NOTE — PHYSICAL EXAM
[de-identified] : General: Well-nourished, well-developed, alert, and in no acute distress. Head: Normocephalic. Eyes: Pupils equal, extraocular muscles intact, normal sclera. Nose: No nasal discharge. Cardiovascular: Extremities are warm and well perfused. Distal pulses are symmetric bilaterally. Respiratory: No labored breathing. Extremities: Sensation is intact distally bilaterally. Distal pulses are symmetric bilaterally Lymphatic: No regional lymphadenopathy, no lymphedema Neurologic: No focal deficits Skin: Normal skin color, texture, and turgor Psychiatric: Normal affect  MSK: Examination of [right] knee: Gait: slightly antalgic   [No] effusion No erythema, hematoma or skin lesion Tender to palpation: medial joint line, popliteal fossa Nontender to palpation: lateral joint line, medial patellar facet, lateral patellar facet, quad tendon, patellar tendon, pes, Gerdy's tubercle, tibial tuberosity, hamstrings, ITB No warmth No Baker's cyst palpable ROM: 0-110 [Mild] patellar crepitus   Log roll negative Lachman negative Anterior drawer negative Posterior drawer negative Varus/valgus stress negative at 0 and 30 deg Shaye negative Patellar grind negative     Sensation is intact to light touch over the superficial and deep peroneal nerve distributions and the posterior tibial nerve distribution. Capillary refill is less than two seconds. Posterior tibial and dorsalis pedis pulses 2+ equal bilaterally. No calf swelling or tenderness bilaterally. Strength testing shows Hip flexion 5/5, Hip adduction 5/5, Hip abduction 5/5, Knee Extension 5/5, Knee Flexion 5/5, dorsiflexion 5/5, plantar flexion 5/5, EHL 5/5 Reflexes: Patellar 2+, Achilles 2+

## 2024-08-06 NOTE — ADDENDUM
[FreeTextEntry1] : Documented by Cee Obrien acting a as a scribe for Dr. Marsha Kauffman. 08/01/2024

## 2024-08-06 NOTE — HISTORY OF PRESENT ILLNESS
[de-identified] : ABDIRIZAK AYALA is a 49-year-old male presents to follow up for right knee. Pt. states the pain stopped after the HA injection.  He is experiencing popping around the Lateral side of the right knee   sends to a doctor; he said the Dr. he saw told him he has tear in meniscus in left knee He mentioned to the Dr. he does not have any problem with the left knee.  He was then told he has Right knee torn meniscus.  He is schedule for SX on 08/09/2024 for Right knee Arthroscopy and Partial medial meniscectomy with Dr. Santino Arana. Last visit was 07/09/2024 Right knee Euflexxa#3 done.

## 2024-08-06 NOTE — ASSESSMENT
[FreeTextEntry1] : ABDIRIZAK AYALA is a 49 year old male with right knee pain     I discussed with the patient that their symptoms, signs, and imaging are most consistent with medial meniscus tear, medial fermoral condyle bone contusion, patellofemoral chondromalacia, osteoarthritis and patellar maltracking.   We reviewed the natural history of this condition and treatment options. We agreed on the following plan:  Encouraged to restart home exercises per handout. New handout provided.  Start physical therapy. New referral provided. Recommend 150 min per week of moderate intensity aerobic activity  Medication: Diclofenac PRN. prescription provided. Limited ultrasound of posterior right knee performed today, which demonstrated a small popliteal cyst.  Patient advised against surgery at this time as he has not exhausted all non-operative treatments. Reassured patient that hyaluronic acid gel injection an take up to 5-6 weeks for effect.  Last injection was less than 4 weeks ago. Follow up in 6-8 weeks.

## 2024-08-06 NOTE — DISCUSSION/SUMMARY

## 2024-08-06 NOTE — PHYSICAL EXAM
[de-identified] : General: Well-nourished, well-developed, alert, and in no acute distress. Head: Normocephalic. Eyes: Pupils equal, extraocular muscles intact, normal sclera. Nose: No nasal discharge. Cardiovascular: Extremities are warm and well perfused. Distal pulses are symmetric bilaterally. Respiratory: No labored breathing. Extremities: Sensation is intact distally bilaterally. Distal pulses are symmetric bilaterally Lymphatic: No regional lymphadenopathy, no lymphedema Neurologic: No focal deficits Skin: Normal skin color, texture, and turgor Psychiatric: Normal affect  MSK: Examination of [right] knee: Gait: slightly antalgic   [No] effusion No erythema, hematoma or skin lesion Tender to palpation: medial joint line, popliteal fossa Nontender to palpation: lateral joint line, medial patellar facet, lateral patellar facet, quad tendon, patellar tendon, pes, Gerdy's tubercle, tibial tuberosity, hamstrings, ITB No warmth No Baker's cyst palpable ROM: 0-110 [Mild] patellar crepitus   Log roll negative Lachman negative Anterior drawer negative Posterior drawer negative Varus/valgus stress negative at 0 and 30 deg Shaye negative Patellar grind negative     Sensation is intact to light touch over the superficial and deep peroneal nerve distributions and the posterior tibial nerve distribution. Capillary refill is less than two seconds. Posterior tibial and dorsalis pedis pulses 2+ equal bilaterally. No calf swelling or tenderness bilaterally. Strength testing shows Hip flexion 5/5, Hip adduction 5/5, Hip abduction 5/5, Knee Extension 5/5, Knee Flexion 5/5, dorsiflexion 5/5, plantar flexion 5/5, EHL 5/5 Reflexes: Patellar 2+, Achilles 2+

## 2024-08-06 NOTE — HISTORY OF PRESENT ILLNESS
[de-identified] : ABDIRIZAK AYALA is a 49-year-old male presents to follow up for right knee. Pt. states the pain stopped after the HA injection.  He is experiencing popping around the Lateral side of the right knee   sends to a doctor; he said the Dr. he saw told him he has tear in meniscus in left knee He mentioned to the Dr. he does not have any problem with the left knee.  He was then told he has Right knee torn meniscus.  He is schedule for SX on 08/09/2024 for Right knee Arthroscopy and Partial medial meniscectomy with Dr. Santino Arana. Last visit was 07/09/2024 Right knee Euflexxa#3 done.

## 2024-08-06 NOTE — END OF VISIT
[FreeTextEntry3] : All medical record entries made by the Gretchenibkierra were at my, Dr. Marsha Kauffman, direction and personally dictated by me on 08/1/2024. I have reviewed the chart and agree that the record accurately reflects my personal performance of the history, physical exam, assessment and plan. I have also personally directed, reviewed, and agreed with the chart. [Time Spent: ___ minutes] : I have spent [unfilled] minutes of time on the encounter.

## 2024-08-06 NOTE — DISCUSSION/SUMMARY

## 2024-09-02 ENCOUNTER — RX RENEWAL (OUTPATIENT)
Age: 49
End: 2024-09-02

## 2024-09-03 ENCOUNTER — APPOINTMENT (OUTPATIENT)
Dept: DERMATOLOGY | Facility: CLINIC | Age: 49
End: 2024-09-03

## 2024-09-03 ENCOUNTER — APPOINTMENT (OUTPATIENT)
Dept: ORTHOPEDIC SURGERY | Age: 49
End: 2024-09-03

## 2024-09-06 NOTE — ASSESSMENT
[FreeTextEntry1] : ABDIRIZAK AYALA is a 49 year old male with     I discussed with the patient that their symptoms, signs, and imaging are most consistent with  **.  We reviewed the natural history of this condition and treatment options. We agreed on the following plan:  Encouraged to continue home exercises per handout. Continue physical therapy. Recommend 150 min per week of moderate intensity aerobic activity  Medication:    prescription provided. Imaging: Follow up in 6-8 weeks.

## 2024-09-06 NOTE — HISTORY OF PRESENT ILLNESS
[de-identified] : ABDIRIZAK AYALA is a 49 year old male presents to follow up Last visit was 08/01/2024 Mohs Rapid Report Verbiage: The area of clinically evident tumor was marked with skin marking ink and appropriately hatched.  The initial incision was made following the Mohs approach through the skin.  The specimen was taken to the lab, divided into the necessary number of pieces, chromacoded and processed according to the Mohs protocol.  This was repeated in successive stages until a tumor free defect was achieved.

## 2024-09-11 ENCOUNTER — APPOINTMENT (OUTPATIENT)
Dept: ORTHOPEDIC SURGERY | Facility: CLINIC | Age: 49
End: 2024-09-11

## 2024-09-16 ENCOUNTER — APPOINTMENT (OUTPATIENT)
Dept: FAMILY MEDICINE | Facility: CLINIC | Age: 49
End: 2024-09-16

## 2024-09-17 NOTE — ASSESSMENT
[FreeTextEntry1] : ABDIRIZAK AYALA is a 49 year old male with     I discussed with the patient that their symptoms, signs, and imaging are most consistent with  **.  We reviewed the natural history of this condition and treatment options. We agreed on the following plan:  Encouraged to continue home exercises per handout. Continue physical therapy. Recommend 150 min per week of moderate intensity aerobic activity  Medication:    prescription provided. Imaging: Follow up in 6-8 weeks. attending Psychiatrist without NP/Trainee

## 2024-09-17 NOTE — HISTORY OF PRESENT ILLNESS
[de-identified] : ABDIRIZAK AYALA is a 49 year old male presents to follow up Last visit was 08/01/2024

## 2024-09-21 ENCOUNTER — NON-APPOINTMENT (OUTPATIENT)
Age: 49
End: 2024-09-21

## 2024-10-03 ENCOUNTER — NON-APPOINTMENT (OUTPATIENT)
Age: 49
End: 2024-10-03

## 2024-10-07 ENCOUNTER — APPOINTMENT (OUTPATIENT)
Dept: ORTHOPEDIC SURGERY | Facility: CLINIC | Age: 49
End: 2024-10-07

## 2024-10-08 ENCOUNTER — APPOINTMENT (OUTPATIENT)
Dept: FAMILY MEDICINE | Facility: CLINIC | Age: 49
End: 2024-10-08
Payer: COMMERCIAL

## 2024-10-08 ENCOUNTER — NON-APPOINTMENT (OUTPATIENT)
Age: 49
End: 2024-10-08

## 2024-10-08 VITALS
SYSTOLIC BLOOD PRESSURE: 134 MMHG | OXYGEN SATURATION: 96 % | DIASTOLIC BLOOD PRESSURE: 86 MMHG | TEMPERATURE: 97.4 F | RESPIRATION RATE: 18 BRPM | HEIGHT: 73 IN | BODY MASS INDEX: 33.53 KG/M2 | HEART RATE: 96 BPM | WEIGHT: 253 LBS

## 2024-10-08 DIAGNOSIS — J39.2 OTHER DISEASES OF PHARYNX: ICD-10-CM

## 2024-10-08 DIAGNOSIS — M17.11 UNILATERAL PRIMARY OSTEOARTHRITIS, RIGHT KNEE: ICD-10-CM

## 2024-10-08 DIAGNOSIS — E05.90 THYROTOXICOSIS, UNSPECIFIED W/OUT THYROTOXIC CRISIS OR STORM: ICD-10-CM

## 2024-10-08 DIAGNOSIS — Z01.818 ENCOUNTER FOR OTHER PREPROCEDURAL EXAMINATION: ICD-10-CM

## 2024-10-08 DIAGNOSIS — R03.0 ELEVATED BLOOD-PRESSURE READING, W/OUT DIAGNOSIS OF HYPERTENSION: ICD-10-CM

## 2024-10-08 DIAGNOSIS — S83.241S OTHER TEAR OF MEDIAL MENISCUS, CURRENT INJURY, RIGHT KNEE, SEQUELA: ICD-10-CM

## 2024-10-08 DIAGNOSIS — Z87.09 PERSONAL HISTORY OF OTHER DISEASES OF THE RESPIRATORY SYSTEM: ICD-10-CM

## 2024-10-08 PROBLEM — E66.811 OBESITY (BMI 30.0-34.9): Status: ACTIVE | Noted: 2024-10-08

## 2024-10-08 PROCEDURE — 36415 COLL VENOUS BLD VENIPUNCTURE: CPT

## 2024-10-08 PROCEDURE — 93000 ELECTROCARDIOGRAM COMPLETE: CPT

## 2024-10-08 PROCEDURE — 99204 OFFICE O/P NEW MOD 45 MIN: CPT

## 2024-10-08 RX ORDER — LISINOPRIL 2.5 MG/1
2.5 TABLET ORAL DAILY
Qty: 90 | Refills: 1 | Status: ACTIVE | COMMUNITY
Start: 2024-10-08 | End: 1900-01-01

## 2024-10-09 LAB
ALBUMIN SERPL ELPH-MCNC: 4.4 G/DL
ALP BLD-CCNC: 96 U/L
ALT SERPL-CCNC: 15 U/L
ANION GAP SERPL CALC-SCNC: 13 MMOL/L
AST SERPL-CCNC: 12 U/L
BASOPHILS # BLD AUTO: 0.06 K/UL
BASOPHILS NFR BLD AUTO: 1.1 %
BILIRUB SERPL-MCNC: 0.6 MG/DL
BUN SERPL-MCNC: 13 MG/DL
CALCIUM SERPL-MCNC: 9.3 MG/DL
CHLORIDE SERPL-SCNC: 101 MMOL/L
CO2 SERPL-SCNC: 22 MMOL/L
CREAT SERPL-MCNC: 1.1 MG/DL
EGFR: 83 ML/MIN/1.73M2
EOSINOPHIL # BLD AUTO: 0.07 K/UL
EOSINOPHIL NFR BLD AUTO: 1.3 %
ESTIMATED AVERAGE GLUCOSE: 212 MG/DL
GLUCOSE SERPL-MCNC: 254 MG/DL
HBA1C MFR BLD HPLC: 9 %
HCT VFR BLD CALC: 41.9 %
HGB BLD-MCNC: 13.7 G/DL
IMM GRANULOCYTES NFR BLD AUTO: 0.7 %
LYMPHOCYTES # BLD AUTO: 2.14 K/UL
LYMPHOCYTES NFR BLD AUTO: 38.7 %
MAN DIFF?: NORMAL
MCHC RBC-ENTMCNC: 25.6 PG
MCHC RBC-ENTMCNC: 32.7 GM/DL
MCV RBC AUTO: 78.2 FL
MONOCYTES # BLD AUTO: 0.37 K/UL
MONOCYTES NFR BLD AUTO: 6.7 %
NEUTROPHILS # BLD AUTO: 2.85 K/UL
NEUTROPHILS NFR BLD AUTO: 51.5 %
PLATELET # BLD AUTO: 299 K/UL
POTASSIUM SERPL-SCNC: 4.4 MMOL/L
PROT SERPL-MCNC: 7.1 G/DL
RBC # BLD: 5.36 M/UL
RBC # FLD: 13.5 %
SODIUM SERPL-SCNC: 136 MMOL/L
T3FREE SERPL-MCNC: 3.23 PG/ML
T4 FREE SERPL-MCNC: 1.5 NG/DL
TSH SERPL-ACNC: 1.01 UIU/ML
WBC # FLD AUTO: 5.53 K/UL

## 2024-10-16 ENCOUNTER — APPOINTMENT (OUTPATIENT)
Dept: ENDOCRINOLOGY | Facility: CLINIC | Age: 49
End: 2024-10-16
Payer: COMMERCIAL

## 2024-10-16 VITALS
RESPIRATION RATE: 18 BRPM | HEART RATE: 75 BPM | DIASTOLIC BLOOD PRESSURE: 85 MMHG | OXYGEN SATURATION: 98 % | WEIGHT: 253 LBS | HEIGHT: 73 IN | BODY MASS INDEX: 33.53 KG/M2 | TEMPERATURE: 97.6 F | SYSTOLIC BLOOD PRESSURE: 125 MMHG

## 2024-10-16 DIAGNOSIS — E78.2 MIXED HYPERLIPIDEMIA: ICD-10-CM

## 2024-10-16 DIAGNOSIS — E66.811 OBESITY, CLASS 1: ICD-10-CM

## 2024-10-16 DIAGNOSIS — E11.65 TYPE 2 DIABETES MELLITUS WITH HYPERGLYCEMIA: ICD-10-CM

## 2024-10-16 LAB — GLUCOSE BLDC GLUCOMTR-MCNC: 201

## 2024-10-16 PROCEDURE — 99214 OFFICE O/P EST MOD 30 MIN: CPT

## 2024-10-16 PROCEDURE — 82962 GLUCOSE BLOOD TEST: CPT

## 2024-10-16 RX ORDER — METFORMIN HYDROCHLORIDE 500 MG/1
500 TABLET, COATED ORAL
Qty: 360 | Refills: 3 | Status: ACTIVE | COMMUNITY
Start: 2024-10-16 | End: 1900-01-01

## 2024-10-16 RX ORDER — TIRZEPATIDE 2.5 MG/.5ML
2.5 INJECTION, SOLUTION SUBCUTANEOUS
Qty: 4 | Refills: 1 | Status: ACTIVE | COMMUNITY
Start: 2024-10-16 | End: 1900-01-01

## 2024-11-12 ENCOUNTER — APPOINTMENT (OUTPATIENT)
Dept: ENDOCRINOLOGY | Facility: CLINIC | Age: 49
End: 2024-11-12
Payer: COMMERCIAL

## 2024-11-12 DIAGNOSIS — E66.811 OBESITY, CLASS 1: ICD-10-CM

## 2024-11-12 DIAGNOSIS — E05.00 THYROTOXICOSIS WITH DIFFUSE GOITER W/OUT THYROTOXIC CRISIS OR STORM: ICD-10-CM

## 2024-11-12 DIAGNOSIS — E78.2 MIXED HYPERLIPIDEMIA: ICD-10-CM

## 2024-11-12 PROCEDURE — 99442: CPT

## 2024-11-15 ENCOUNTER — TRANSCRIPTION ENCOUNTER (OUTPATIENT)
Age: 49
End: 2024-11-15

## 2024-11-19 DIAGNOSIS — E11.65 TYPE 2 DIABETES MELLITUS WITH HYPERGLYCEMIA: ICD-10-CM

## 2024-11-19 RX ORDER — TIRZEPATIDE 2.5 MG/.5ML
2.5 INJECTION, SOLUTION SUBCUTANEOUS
Qty: 4 | Refills: 1 | Status: ACTIVE | COMMUNITY
Start: 2024-11-19 | End: 1900-01-01

## 2024-12-05 ENCOUNTER — APPOINTMENT (OUTPATIENT)
Dept: DERMATOLOGY | Facility: CLINIC | Age: 49
End: 2024-12-05

## 2025-04-02 ENCOUNTER — NON-APPOINTMENT (OUTPATIENT)
Age: 50
End: 2025-04-02

## 2025-04-06 ENCOUNTER — EMERGENCY (EMERGENCY)
Facility: HOSPITAL | Age: 50
LOS: 1 days | End: 2025-04-06
Attending: EMERGENCY MEDICINE | Admitting: EMERGENCY MEDICINE
Payer: COMMERCIAL

## 2025-04-06 VITALS
SYSTOLIC BLOOD PRESSURE: 133 MMHG | RESPIRATION RATE: 16 BRPM | TEMPERATURE: 98 F | OXYGEN SATURATION: 100 % | DIASTOLIC BLOOD PRESSURE: 99 MMHG | HEART RATE: 66 BPM

## 2025-04-06 VITALS
TEMPERATURE: 98 F | HEIGHT: 73 IN | RESPIRATION RATE: 18 BRPM | WEIGHT: 248.02 LBS | HEART RATE: 73 BPM | DIASTOLIC BLOOD PRESSURE: 85 MMHG | OXYGEN SATURATION: 98 % | SYSTOLIC BLOOD PRESSURE: 135 MMHG

## 2025-04-06 LAB
ALBUMIN SERPL ELPH-MCNC: 4 G/DL — SIGNIFICANT CHANGE UP (ref 3.3–5)
ALP SERPL-CCNC: 78 U/L — SIGNIFICANT CHANGE UP (ref 40–120)
ALT FLD-CCNC: 13 U/L — SIGNIFICANT CHANGE UP (ref 4–41)
ANION GAP SERPL CALC-SCNC: 14 MMOL/L — SIGNIFICANT CHANGE UP (ref 7–14)
APTT BLD: 36.8 SEC — HIGH (ref 24.5–35.6)
AST SERPL-CCNC: 16 U/L — SIGNIFICANT CHANGE UP (ref 4–40)
BASOPHILS # BLD AUTO: 0.03 K/UL — SIGNIFICANT CHANGE UP (ref 0–0.2)
BASOPHILS NFR BLD AUTO: 0.6 % — SIGNIFICANT CHANGE UP (ref 0–2)
BILIRUB SERPL-MCNC: 0.6 MG/DL — SIGNIFICANT CHANGE UP (ref 0.2–1.2)
BUN SERPL-MCNC: 12 MG/DL — SIGNIFICANT CHANGE UP (ref 7–23)
CALCIUM SERPL-MCNC: 9.1 MG/DL — SIGNIFICANT CHANGE UP (ref 8.4–10.5)
CHLORIDE SERPL-SCNC: 104 MMOL/L — SIGNIFICANT CHANGE UP (ref 98–107)
CO2 SERPL-SCNC: 21 MMOL/L — LOW (ref 22–31)
CREAT SERPL-MCNC: 1.16 MG/DL — SIGNIFICANT CHANGE UP (ref 0.5–1.3)
D DIMER BLD IA.RAPID-MCNC: 244 NG/ML DDU — HIGH
EGFR: 78 ML/MIN/1.73M2 — SIGNIFICANT CHANGE UP
EGFR: 78 ML/MIN/1.73M2 — SIGNIFICANT CHANGE UP
EOSINOPHIL # BLD AUTO: 0.16 K/UL — SIGNIFICANT CHANGE UP (ref 0–0.5)
EOSINOPHIL NFR BLD AUTO: 3.1 % — SIGNIFICANT CHANGE UP (ref 0–6)
GLUCOSE SERPL-MCNC: 177 MG/DL — HIGH (ref 70–99)
HCT VFR BLD CALC: 41.2 % — SIGNIFICANT CHANGE UP (ref 39–50)
HGB BLD-MCNC: 13.7 G/DL — SIGNIFICANT CHANGE UP (ref 13–17)
IANC: 2.43 K/UL — SIGNIFICANT CHANGE UP (ref 1.8–7.4)
IMM GRANULOCYTES NFR BLD AUTO: 1 % — HIGH (ref 0–0.9)
INR BLD: 0.98 RATIO — SIGNIFICANT CHANGE UP (ref 0.85–1.16)
LIDOCAIN IGE QN: 127 U/L — HIGH (ref 7–60)
LYMPHOCYTES # BLD AUTO: 2.12 K/UL — SIGNIFICANT CHANGE UP (ref 1–3.3)
LYMPHOCYTES # BLD AUTO: 41.5 % — SIGNIFICANT CHANGE UP (ref 13–44)
MCHC RBC-ENTMCNC: 25 PG — LOW (ref 27–34)
MCHC RBC-ENTMCNC: 33.3 G/DL — SIGNIFICANT CHANGE UP (ref 32–36)
MCV RBC AUTO: 75 FL — LOW (ref 80–100)
MONOCYTES # BLD AUTO: 0.32 K/UL — SIGNIFICANT CHANGE UP (ref 0–0.9)
MONOCYTES NFR BLD AUTO: 6.3 % — SIGNIFICANT CHANGE UP (ref 2–14)
NEUTROPHILS # BLD AUTO: 2.43 K/UL — SIGNIFICANT CHANGE UP (ref 1.8–7.4)
NEUTROPHILS NFR BLD AUTO: 47.5 % — SIGNIFICANT CHANGE UP (ref 43–77)
NRBC # BLD AUTO: 0 K/UL — SIGNIFICANT CHANGE UP (ref 0–0)
NRBC # FLD: 0 K/UL — SIGNIFICANT CHANGE UP (ref 0–0)
NRBC BLD AUTO-RTO: 0 /100 WBCS — SIGNIFICANT CHANGE UP (ref 0–0)
NT-PROBNP SERPL-SCNC: <36 PG/ML — SIGNIFICANT CHANGE UP
PLATELET # BLD AUTO: 294 K/UL — SIGNIFICANT CHANGE UP (ref 150–400)
POTASSIUM SERPL-MCNC: 4.3 MMOL/L — SIGNIFICANT CHANGE UP (ref 3.5–5.3)
POTASSIUM SERPL-SCNC: 4.3 MMOL/L — SIGNIFICANT CHANGE UP (ref 3.5–5.3)
PROT SERPL-MCNC: 6.9 G/DL — SIGNIFICANT CHANGE UP (ref 6–8.3)
PROTHROM AB SERPL-ACNC: 11.7 SEC — SIGNIFICANT CHANGE UP (ref 9.9–13.4)
RBC # BLD: 5.49 M/UL — SIGNIFICANT CHANGE UP (ref 4.2–5.8)
RBC # FLD: 14.3 % — SIGNIFICANT CHANGE UP (ref 10.3–14.5)
SODIUM SERPL-SCNC: 139 MMOL/L — SIGNIFICANT CHANGE UP (ref 135–145)
TROPONIN T, HIGH SENSITIVITY RESULT: <6 NG/L — SIGNIFICANT CHANGE UP
WBC # BLD: 5.11 K/UL — SIGNIFICANT CHANGE UP (ref 3.8–10.5)
WBC # FLD AUTO: 5.11 K/UL — SIGNIFICANT CHANGE UP (ref 3.8–10.5)

## 2025-04-06 PROCEDURE — 99285 EMERGENCY DEPT VISIT HI MDM: CPT

## 2025-04-06 PROCEDURE — 76705 ECHO EXAM OF ABDOMEN: CPT | Mod: 26

## 2025-04-06 PROCEDURE — 74177 CT ABD & PELVIS W/CONTRAST: CPT | Mod: 26

## 2025-04-06 PROCEDURE — 71275 CT ANGIOGRAPHY CHEST: CPT | Mod: 26

## 2025-04-06 PROCEDURE — 71046 X-RAY EXAM CHEST 2 VIEWS: CPT | Mod: 26

## 2025-04-06 PROCEDURE — 93010 ELECTROCARDIOGRAM REPORT: CPT

## 2025-04-06 NOTE — ED ADULT TRIAGE NOTE - CHIEF COMPLAINT QUOTE
c/o right sided and midsternal chest pain onset 2 weeks ago, reports was evaluated by PMD for viral infections with negative findings, Phx of DM type 2.

## 2025-04-06 NOTE — ED ADULT NURSE NOTE - OBJECTIVE STATEMENT
Received pt to Intake Rm 7 from home with c/o intermittent chest pain x 1 week. Pt reports pain across chest and varies intensity and location. Pt endorses presently pain is on the right side under breast and mid sternum. Pt is A&OX 4, skin warm dry unremarkable, + strong regula radial pulses bi laterally. #20g IV placed to R forearm, lab work collected as ordered.

## 2025-04-06 NOTE — ED PROVIDER NOTE - PATIENT PORTAL LINK FT
You can access the FollowMyHealth Patient Portal offered by [facility] by registering at the following website: [email or web address]. By joining [employer]’s FollowMyHealth portal, you will also be able to view your health information using other applications (apps) compatible with our system.

## 2025-04-06 NOTE — ED PROVIDER NOTE - PROGRESS NOTE DETAILS
Patient stable.  For troponin less than 6.  D-dimer elevated at 244.  And right upper quadrant ultrasound unremarkable.  Patient updated states he is feeling little bit better.  Will proceed with a CT angio of the chest rule out PE and CT abdomen pelvis given right upper quadrant discomfort.  Patient and wife aware. Patient stable on a cardiac monitor sleeping.  Pending CTs to be performed.

## 2025-04-06 NOTE — ED PROVIDER NOTE - PHYSICAL EXAMINATION
Vital signs noted.  Patient not tachycardic, not tachypneic, not hypoxic satting 98% on room air blood pressure 135/85 patient noted to be ambulatory from triage to room 7 without any obvious respiratory distress.  Able to answer questions in full clear sentences.  normal S1-S2   No resp distress. able to speak in full and clear sentences. no wheeze, rales or stridor.  Soft abdomen nondistended with mild tenderness in the right upper quadrant.  No Kennedy sign.  No rebound or guarding.  No rashes no vesicular lesions appreciated.  no pedal edema. no calf tenderness. normal pulses bilateral feet.

## 2025-04-06 NOTE — ED PROVIDER NOTE - OBJECTIVE STATEMENT
49-year-old male history of diabetes, hypertension, seizure disorder, from home chief complaints of chest pain x 2 weeks.  States his chest pain location varies currently is located primarily on the right side underneath the breast and mid sternum.  But it has varied to the left leading to the arm, and on the right side.  Patient denies associated shortness of breath or dyspnea on exertion.  Denies palpitations.  Denies feeling dizzy or lightheaded.  Denies fever or chills.  No cough.  Denies any recent travel or long plane or car rides.  Denies history of PE or DVT.  Non-smoker.  States his father had congestive heart failure.  Patient has never been evaluated by cardiologist does have an appointment with a cardiologist this week as per wife.

## 2025-04-06 NOTE — ED ADULT NURSE REASSESSMENT NOTE - NS ED NURSE REASSESS COMMENT FT1
Break RN: Pt is A&Ox4, resting in stretcher with no complaints at this time. Pt reports pain is minimal at this time. Respirations even and unlabored, chest rise equal b/l. NSR noted on monitor. VS as noted in flow sheets. Pt denies chest pain, SOB, chills, abdominal pain, N/V/D, h/a, dizziness, numbness/tingling or any urinary symptoms at this time. No acute distress noted. Pt pending CT results. Safety maintained throughout.

## 2025-04-06 NOTE — ED PROVIDER NOTE - NSFOLLOWUPINSTRUCTIONS_ED_ALL_ED_FT
You were evaluated the emergency department due to chest pain and upper abdominal pain.  Your exam, blood work, and imaging did not reveal a dangerous cause for your symptoms.  Follow-up with your regular medical doctor and bring results provided today.  You can take pain medication as needed.  Tylenol up to 1000mg up to 4x per day, as needed for pain or fever.  Ibuprofen (Motrin or Advil) 400-600mg up to 3x per day, take with food.  Return to emergency for worsening pain, fever, weakness, numbness, nausea, vomiting, shortness of breath, dizziness, fainting or any signs of distress.

## 2025-04-06 NOTE — ED PROVIDER NOTE - CLINICAL SUMMARY MEDICAL DECISION MAKING FREE TEXT BOX
Plan EKG, labs, chest x-ray, right upper quadrant ultrasound, pain meds as needed, cardiac monitor and reassess.  Plan discussed with patient and wife at bedside.  EKG was sinus rhythm with T wave inversion in lead III.

## 2025-06-05 ENCOUNTER — RX RENEWAL (OUTPATIENT)
Age: 50
End: 2025-06-05

## 2025-06-28 ENCOUNTER — EMERGENCY (EMERGENCY)
Facility: HOSPITAL | Age: 50
LOS: 1 days | End: 2025-06-28
Attending: EMERGENCY MEDICINE | Admitting: EMERGENCY MEDICINE
Payer: OTHER MISCELLANEOUS

## 2025-06-28 VITALS
DIASTOLIC BLOOD PRESSURE: 90 MMHG | TEMPERATURE: 98 F | WEIGHT: 250 LBS | OXYGEN SATURATION: 97 % | HEART RATE: 65 BPM | RESPIRATION RATE: 18 BRPM | SYSTOLIC BLOOD PRESSURE: 151 MMHG

## 2025-06-28 NOTE — ED PROVIDER NOTE - NSFOLLOWUPINSTRUCTIONS_ED_ALL_ED_FT
Keep wound clean and dry.  Return here if needed.    [employer] CareNotes®  :  [facility]        FACIAL LACERATION - General Information    Facial Laceration    WHAT YOU NEED TO KNOW:    What is a facial laceration? A facial laceration is a cut, tear, or gash in your skin and soft tissue under it. Facial lacerations may be closed within 24 hours of injury.    How is a facial laceration diagnosed?    Tell your healthcare provider how you got your laceration. Your provider will examine your wound and decide what treatment you need. An x-ray, ultrasound, CT, or MRI may show foreign objects in the wound. Foreign objects include metal, gravel, and glass. The tests may also show damage to deeper tissues.    You may be given contrast liquid to help the injured area show up better in the pictures. Tell the provider if you have ever had an allergic reaction to contrast liquid. Do not enter the MRI room with anything metal. Metal can cause serious injury. Tell the provider if you have any metal in or on your body.  How will my facial laceration be treated? Treatment depends on how large and deep your laceration is, and where it is located. You may need any of the following:    Pressure may be applied to the wound to stop bleeding.    Wound cleaning may help remove dirt or debris. This will decrease the risk for infection. Your healthcare provider may need to look inside your wound for foreign objects. Your provider may give you medicine to numb the area and decrease pain. You may also be given medicine to help you relax.    Wound closure with stitches, staples, tissue glue, or medical strips may be needed. Your provider may give you medicine to numb the area and decrease pain. You may also be given medicine to help you relax. Some lacerations may heal better without stitches. Your wound may instead be left open for some days if there is a risk for infection. It may also be left open if it has been more than 24 hours since your injury happened. You may need to have your laceration cleaned for several days before it is closed.        Medicine to treat pain or prevent infection may be given. You may also be given a tetanus shot. Your provider will decide if you need a tetanus shot. Wounds at high risk for tetanus infection include wounds with dirt or saliva in them. You should get a tetanus shot within 72 hours of getting a laceration or wound. Tell your provider if you have had the tetanus vaccine or a booster within the last 5 years.    Surgery may be needed if your laceration needs additional cleaning or removal of foreign objects.  How can I manage my symptoms?    Rest as needed. Some activities, such as bending over, may cause too much pressure in your face. Your laceration may begin to bleed.    Apply ice to the wound for 15 to 20 minutes every hour or as directed. Use an ice pack, or put crushed ice in a plastic bag. Cover the bag with a towel before you apply it. Ice helps decrease swelling and pain.    Decrease scarring. The skin around your wound may turn a different color if it is exposed to direct sunlight. After your wound is healed, use sunscreen over the area when you are out in the sun. You should do this for at least 6 months to 1 year after your injury. Some wounds scar less if they are covered while they heal.  When should I seek immediate care?    You have heavy bleeding or bleeding that does not stop after 10 minutes of holding firm, direct pressure over the wound.    Your wound reopens.    You have new numbness or tingling near your wound, or weakness of your facial muscles.  When should I call my doctor?    You have a fever or chills.    Your wound is red, warm, or swollen.    You have pain that gets worse, even after treatment.    Your wound is not healing, or you think there is an object in the wound.    You have questions or concerns about your condition or care.  CARE AGREEMENT:    You have the right to help plan your care. Learn about your health condition and how it may be treated. Discuss treatment options with your healthcare providers to decide what care you want to receive. You always have the right to refuse treatment.    © [employer] 1973, 2025    	  back to top

## 2025-06-28 NOTE — ED PROVIDER NOTE - PHYSICAL EXAMINATION
Examination reveals a well-developed well-nourished pleasant comfortable black male in no acute distress with a superficial laceration/deep abrasion to the forehead and a 0.5 cm superficial laceration to the right upper eyelid.  Pupils are equal round reactive to light and accommodation extraocular movements are intact

## 2025-06-28 NOTE — ED PROVIDER NOTE - PATIENT PORTAL LINK FT
You can access the FollowMyHealth Patient Portal offered by [facility] by registering at the following website: [email or web address]. By joining [employer]’s FollowMyHealth portal, you will also be able to view your health information using other applications (apps) compatible with our system.

## 2025-06-28 NOTE — ED PROVIDER NOTE - CARE PROVIDER_API CALL
Annette Sandoval  Plastic Surgery  [address] [place] [address], [address]  [place] [address]  Phone: [phone]  Fax: [phone]  Follow Up Time:

## 2025-06-28 NOTE — ED PROVIDER NOTE - CLINICAL SUMMARY MEDICAL DECISION MAKING FREE TEXT BOX
Superficial laceration to the right upper eyelid requiring thorough evaluation to be performed in an independent manner followed by laceration repair with Dermabond.

## 2025-06-28 NOTE — ED PROVIDER NOTE - CARE PLAN
NOtified MD regarding javier (+) NB; Notified MD that specimen clotted X 3 for H/H retic blood draw. Specimen determined to be usable. MD requesting TSB @10 PM if TSB is > 4.5 phototherapy to start. Principal Discharge DX:	Superficial laceration of skin   1

## 2025-06-28 NOTE — ED PROVIDER NOTE - OBJECTIVE STATEMENT
49-year-old black male while at work accidentally made contact between the edge of a mirror and right upper eyelid presenting here with superficial laceration right upper eyelid.  No altered vision no double vision no blurred vision no loss of consciousness

## 2025-06-28 NOTE — ED ADULT NURSE NOTE - OBJECTIVE STATEMENT
pt states that he had a  laceration to right side of forehead and right eyelid at work today. pt states that he was   hit with a box. pt denies LOC and anticoagulant use. pt in no acute distress. pt updated on plan of care.

## 2025-07-07 ENCOUNTER — NON-APPOINTMENT (OUTPATIENT)
Age: 50
End: 2025-07-07

## 2025-08-28 ENCOUNTER — APPOINTMENT (OUTPATIENT)
Dept: ORTHOPEDIC SURGERY | Facility: CLINIC | Age: 50
End: 2025-08-28
Payer: COMMERCIAL

## 2025-08-28 VITALS
WEIGHT: 253 LBS | HEIGHT: 73 IN | TEMPERATURE: 97.7 F | OXYGEN SATURATION: 97 % | SYSTOLIC BLOOD PRESSURE: 135 MMHG | BODY MASS INDEX: 33.53 KG/M2 | DIASTOLIC BLOOD PRESSURE: 88 MMHG | HEART RATE: 63 BPM

## 2025-08-28 DIAGNOSIS — S83.241S OTHER TEAR OF MEDIAL MENISCUS, CURRENT INJURY, RIGHT KNEE, SEQUELA: ICD-10-CM

## 2025-08-28 DIAGNOSIS — M71.21 SYNOVIAL CYST OF POPLITEAL SPACE [BAKER], RIGHT KNEE: ICD-10-CM

## 2025-08-28 DIAGNOSIS — M17.11 UNILATERAL PRIMARY OSTEOARTHRITIS, RIGHT KNEE: ICD-10-CM

## 2025-08-28 PROCEDURE — 20611 DRAIN/INJ JOINT/BURSA W/US: CPT | Mod: RT

## 2025-08-28 PROCEDURE — 99213 OFFICE O/P EST LOW 20 MIN: CPT | Mod: 25

## 2025-09-07 ENCOUNTER — NON-APPOINTMENT (OUTPATIENT)
Age: 50
End: 2025-09-07